# Patient Record
Sex: FEMALE | Race: WHITE | Employment: FULL TIME | URBAN - NONMETROPOLITAN AREA
[De-identification: names, ages, dates, MRNs, and addresses within clinical notes are randomized per-mention and may not be internally consistent; named-entity substitution may affect disease eponyms.]

---

## 2017-07-21 ENCOUNTER — HOSPITAL ENCOUNTER (EMERGENCY)
Age: 57
Discharge: HOME | End: 2017-07-21
Attending: EMERGENCY MEDICINE
Payer: COMMERCIAL

## 2017-07-21 VITALS
DIASTOLIC BLOOD PRESSURE: 69 MMHG | HEART RATE: 88 BPM | OXYGEN SATURATION: 96 % | RESPIRATION RATE: 20 BRPM | SYSTOLIC BLOOD PRESSURE: 108 MMHG | TEMPERATURE: 96.8 F

## 2017-07-21 DIAGNOSIS — R19.7 DIARRHEA, UNSPECIFIED TYPE: ICD-10-CM

## 2017-07-21 DIAGNOSIS — E87.6 HYPOKALEMIA: Primary | ICD-10-CM

## 2017-07-21 LAB
-: ABNORMAL
ABSOLUTE EOS #: 0.2 K/UL (ref 0–0.4)
ABSOLUTE LYMPH #: 0.9 K/UL (ref 1.1–2.7)
ABSOLUTE MONO #: 0.6 K/UL (ref 0–1)
ALBUMIN SERPL-MCNC: 4.3 G/DL (ref 3.5–5.2)
ALBUMIN/GLOBULIN RATIO: ABNORMAL (ref 1–2.5)
ALP BLD-CCNC: 90 U/L (ref 35–104)
ALT SERPL-CCNC: 29 U/L (ref 5–33)
AMORPHOUS: ABNORMAL
ANION GAP SERPL CALCULATED.3IONS-SCNC: 20 MMOL/L (ref 9–17)
AST SERPL-CCNC: 24 U/L
BACTERIA: ABNORMAL
BASOPHILS # BLD: 0 %
BASOPHILS ABSOLUTE: 0 K/UL (ref 0–0.2)
BILIRUB SERPL-MCNC: 0.57 MG/DL (ref 0.3–1.2)
BILIRUBIN URINE: ABNORMAL
BUN BLDV-MCNC: 12 MG/DL (ref 6–20)
BUN/CREAT BLD: 13 (ref 9–20)
CALCIUM SERPL-MCNC: 9.5 MG/DL (ref 8.6–10.4)
CAMPYLOBACTER PCR: NORMAL
CASTS UA: ABNORMAL /LPF
CHLORIDE BLD-SCNC: 100 MMOL/L (ref 98–107)
CO2: 22 MMOL/L (ref 20–31)
COLOR: YELLOW
COMMENT UA: ABNORMAL
CREAT SERPL-MCNC: 0.9 MG/DL (ref 0.5–0.9)
CRYSTALS, UA: ABNORMAL /HPF
DIFFERENTIAL TYPE: YES
EOSINOPHILS RELATIVE PERCENT: 2 %
EPITHELIAL CELLS UA: ABNORMAL /HPF
GFR AFRICAN AMERICAN: >60 ML/MIN
GFR NON-AFRICAN AMERICAN: >60 ML/MIN
GFR SERPL CREATININE-BSD FRML MDRD: ABNORMAL ML/MIN/{1.73_M2}
GFR SERPL CREATININE-BSD FRML MDRD: ABNORMAL ML/MIN/{1.73_M2}
GLUCOSE BLD-MCNC: 113 MG/DL (ref 70–99)
GLUCOSE URINE: NEGATIVE
HCT VFR BLD CALC: 43 % (ref 36–46)
HEMOGLOBIN: 14.1 G/DL (ref 12–16)
KETONES, URINE: ABNORMAL
LEUKOCYTE ESTERASE, URINE: ABNORMAL
LIPASE: 54 U/L (ref 13–60)
LYMPHOCYTES # BLD: 11 %
MCH RBC QN AUTO: 28.1 PG (ref 26–34)
MCHC RBC AUTO-ENTMCNC: 32.7 G/DL (ref 31–37)
MCV RBC AUTO: 86.1 FL (ref 80–100)
MONOCYTES # BLD: 7 %
MUCUS: ABNORMAL
NITRITE, URINE: NEGATIVE
OTHER OBSERVATIONS UA: ABNORMAL
PDW BLD-RTO: 15.3 % (ref 12.1–15.2)
PH UA: 6 (ref 5–8)
PLATELET # BLD: 279 K/UL (ref 140–450)
PLATELET ESTIMATE: ABNORMAL
PMV BLD AUTO: ABNORMAL FL (ref 6–12)
POTASSIUM SERPL-SCNC: 2.8 MMOL/L (ref 3.7–5.3)
PROTEIN UA: ABNORMAL
RBC # BLD: 4.99 M/UL (ref 4–5.2)
RBC # BLD: ABNORMAL 10*6/UL
RBC UA: ABNORMAL /HPF (ref 0–2)
RENAL EPITHELIAL, UA: ABNORMAL /HPF
SALMONELLA PCR: NORMAL
SEG NEUTROPHILS: 80 %
SEGMENTED NEUTROPHILS ABSOLUTE COUNT: 6.1 K/UL (ref 2.5–7)
SHIGATOXIN GENE PCR: NORMAL
SHIGELLA SP PCR: NORMAL
SODIUM BLD-SCNC: 142 MMOL/L (ref 135–144)
SPECIFIC GRAVITY UA: 1.02 (ref 1–1.03)
SPECIMEN: NORMAL
TOTAL PROTEIN: 7.8 G/DL (ref 6.4–8.3)
TRICHOMONAS: ABNORMAL
TURBIDITY: ABNORMAL
URINE HGB: ABNORMAL
UROBILINOGEN, URINE: NORMAL
WBC # BLD: 7.7 K/UL (ref 3.5–11)
WBC # BLD: ABNORMAL 10*3/UL
WBC UA: ABNORMAL /HPF
YEAST: ABNORMAL

## 2017-07-21 PROCEDURE — 36415 COLL VENOUS BLD VENIPUNCTURE: CPT

## 2017-07-21 PROCEDURE — 99283 EMERGENCY DEPT VISIT LOW MDM: CPT

## 2017-07-21 PROCEDURE — 6360000002 HC RX W HCPCS: Performed by: EMERGENCY MEDICINE

## 2017-07-21 PROCEDURE — 80053 COMPREHEN METABOLIC PANEL: CPT

## 2017-07-21 PROCEDURE — 85025 COMPLETE CBC W/AUTO DIFF WBC: CPT

## 2017-07-21 PROCEDURE — 87086 URINE CULTURE/COLONY COUNT: CPT

## 2017-07-21 PROCEDURE — 96365 THER/PROPH/DIAG IV INF INIT: CPT

## 2017-07-21 PROCEDURE — 87505 NFCT AGENT DETECTION GI: CPT

## 2017-07-21 PROCEDURE — 6370000000 HC RX 637 (ALT 250 FOR IP): Performed by: EMERGENCY MEDICINE

## 2017-07-21 PROCEDURE — 83690 ASSAY OF LIPASE: CPT

## 2017-07-21 PROCEDURE — 2580000003 HC RX 258: Performed by: EMERGENCY MEDICINE

## 2017-07-21 PROCEDURE — 81001 URINALYSIS AUTO W/SCOPE: CPT

## 2017-07-21 RX ORDER — POTASSIUM CHLORIDE 750 MG/1
40 TABLET, FILM COATED, EXTENDED RELEASE ORAL ONCE
Status: COMPLETED | OUTPATIENT
Start: 2017-07-21 | End: 2017-07-21

## 2017-07-21 RX ORDER — LOSARTAN POTASSIUM 50 MG/1
50 TABLET ORAL DAILY
COMMUNITY

## 2017-07-21 RX ORDER — ONDANSETRON 4 MG/1
4 TABLET, ORALLY DISINTEGRATING ORAL EVERY 8 HOURS PRN
Qty: 6 TABLET | Refills: 0 | Status: SHIPPED | OUTPATIENT
Start: 2017-07-21 | End: 2017-07-23

## 2017-07-21 RX ORDER — POTASSIUM CHLORIDE 7.45 MG/ML
10 INJECTION INTRAVENOUS ONCE
Status: COMPLETED | OUTPATIENT
Start: 2017-07-21 | End: 2017-07-21

## 2017-07-21 RX ORDER — 0.9 % SODIUM CHLORIDE 0.9 %
1000 INTRAVENOUS SOLUTION INTRAVENOUS ONCE
Status: COMPLETED | OUTPATIENT
Start: 2017-07-21 | End: 2017-07-21

## 2017-07-21 RX ORDER — POTASSIUM CHLORIDE 20 MEQ/1
20 TABLET, EXTENDED RELEASE ORAL 2 TIMES DAILY
Qty: 14 TABLET | Refills: 0 | Status: SHIPPED | OUTPATIENT
Start: 2017-07-21 | End: 2018-05-19 | Stop reason: ALTCHOICE

## 2017-07-21 RX ADMIN — SODIUM CHLORIDE 1000 ML: 9 INJECTION, SOLUTION INTRAVENOUS at 08:05

## 2017-07-21 RX ADMIN — POTASSIUM CHLORIDE 10 MEQ: 10 INJECTION, SOLUTION INTRAVENOUS at 08:53

## 2017-07-21 RX ADMIN — POTASSIUM CHLORIDE 40 MEQ: 750 TABLET, FILM COATED, EXTENDED RELEASE ORAL at 08:52

## 2017-07-21 ASSESSMENT — PAIN DESCRIPTION - LOCATION: LOCATION: ABDOMEN

## 2017-07-22 LAB
CULTURE: NORMAL
CULTURE: NORMAL
Lab: NORMAL
SPECIMEN DESCRIPTION: NORMAL
SPECIMEN DESCRIPTION: NORMAL
STATUS: NORMAL

## 2017-09-16 ENCOUNTER — HOSPITAL ENCOUNTER (OUTPATIENT)
Age: 57
Discharge: HOME | End: 2017-09-16
Payer: COMMERCIAL

## 2017-09-16 LAB
ALBUMIN SERPL-MCNC: 3.9 G/DL (ref 3.5–5.2)
ALBUMIN/GLOBULIN RATIO: ABNORMAL (ref 1–2.5)
ALP BLD-CCNC: 81 U/L (ref 35–104)
ALT SERPL-CCNC: 12 U/L (ref 5–33)
ANION GAP SERPL CALCULATED.3IONS-SCNC: 12 MMOL/L (ref 9–17)
AST SERPL-CCNC: 13 U/L
BILIRUB SERPL-MCNC: 0.47 MG/DL (ref 0.3–1.2)
BUN BLDV-MCNC: 15 MG/DL (ref 6–20)
BUN/CREAT BLD: 23 (ref 9–20)
CALCIUM SERPL-MCNC: 9.2 MG/DL (ref 8.6–10.4)
CHLORIDE BLD-SCNC: 105 MMOL/L (ref 98–107)
CHOLESTEROL/HDL RATIO: 3.6
CHOLESTEROL: 236 MG/DL
CO2: 25 MMOL/L (ref 20–31)
CREAT SERPL-MCNC: 0.64 MG/DL (ref 0.5–0.9)
GFR AFRICAN AMERICAN: >60 ML/MIN
GFR NON-AFRICAN AMERICAN: >60 ML/MIN
GFR SERPL CREATININE-BSD FRML MDRD: ABNORMAL ML/MIN/{1.73_M2}
GFR SERPL CREATININE-BSD FRML MDRD: ABNORMAL ML/MIN/{1.73_M2}
GLUCOSE BLD-MCNC: 99 MG/DL (ref 70–99)
HCT VFR BLD CALC: 37.6 % (ref 36–46)
HDLC SERPL-MCNC: 66 MG/DL
HEMOGLOBIN: 12.6 G/DL (ref 12–16)
LDL CHOLESTEROL: 143 MG/DL (ref 0–130)
MCH RBC QN AUTO: 28.6 PG (ref 26–34)
MCHC RBC AUTO-ENTMCNC: 33.5 G/DL (ref 31–37)
MCV RBC AUTO: 85.6 FL (ref 80–100)
PATIENT FASTING?: YES
PDW BLD-RTO: 15.2 % (ref 12.1–15.2)
PLATELET # BLD: 259 K/UL (ref 140–450)
PMV BLD AUTO: NORMAL FL (ref 6–12)
POTASSIUM SERPL-SCNC: 4.2 MMOL/L (ref 3.7–5.3)
RBC # BLD: 4.4 M/UL (ref 4–5.2)
SODIUM BLD-SCNC: 142 MMOL/L (ref 135–144)
TOTAL PROTEIN: 7.2 G/DL (ref 6.4–8.3)
TRIGL SERPL-MCNC: 134 MG/DL
TSH SERPL DL<=0.05 MIU/L-ACNC: 3.33 MIU/L (ref 0.3–5)
VLDLC SERPL CALC-MCNC: ABNORMAL MG/DL (ref 1–30)
WBC # BLD: 5.3 K/UL (ref 3.5–11)

## 2017-09-16 PROCEDURE — 36415 COLL VENOUS BLD VENIPUNCTURE: CPT

## 2017-09-16 PROCEDURE — 85027 COMPLETE CBC AUTOMATED: CPT

## 2017-09-16 PROCEDURE — 80053 COMPREHEN METABOLIC PANEL: CPT

## 2017-09-16 PROCEDURE — 80061 LIPID PANEL: CPT

## 2017-09-16 PROCEDURE — 84443 ASSAY THYROID STIM HORMONE: CPT

## 2018-04-14 ENCOUNTER — HOSPITAL ENCOUNTER (OUTPATIENT)
Age: 58
Discharge: HOME | End: 2018-04-14
Payer: COMMERCIAL

## 2018-04-14 LAB
ABSOLUTE EOS #: 0.3 K/UL (ref 0–0.4)
ABSOLUTE IMMATURE GRANULOCYTE: ABNORMAL K/UL (ref 0–0.3)
ABSOLUTE LYMPH #: 1.3 K/UL (ref 1–4.8)
ABSOLUTE MONO #: 0.4 K/UL (ref 0–1)
ALBUMIN SERPL-MCNC: 3.9 G/DL (ref 3.5–5.2)
ALBUMIN/GLOBULIN RATIO: ABNORMAL (ref 1–2.5)
ALP BLD-CCNC: 84 U/L (ref 35–104)
ALT SERPL-CCNC: 13 U/L (ref 5–33)
ANION GAP SERPL CALCULATED.3IONS-SCNC: 12 MMOL/L (ref 9–17)
AST SERPL-CCNC: 14 U/L
BASOPHILS # BLD: 0 % (ref 0–2)
BASOPHILS ABSOLUTE: 0 K/UL (ref 0–0.2)
BILIRUB SERPL-MCNC: 0.41 MG/DL (ref 0.3–1.2)
BUN BLDV-MCNC: 15 MG/DL (ref 6–20)
BUN/CREAT BLD: 22 (ref 9–20)
CALCIUM SERPL-MCNC: 9.4 MG/DL (ref 8.6–10.4)
CHLORIDE BLD-SCNC: 101 MMOL/L (ref 98–107)
CHOLESTEROL/HDL RATIO: 3.5
CHOLESTEROL: 233 MG/DL
CO2: 27 MMOL/L (ref 20–31)
CREAT SERPL-MCNC: 0.68 MG/DL (ref 0.5–0.9)
DIFFERENTIAL TYPE: YES
EOSINOPHILS RELATIVE PERCENT: 6 % (ref 0–5)
ESTIMATED AVERAGE GLUCOSE: 108 MG/DL
GFR AFRICAN AMERICAN: >60 ML/MIN
GFR NON-AFRICAN AMERICAN: >60 ML/MIN
GFR SERPL CREATININE-BSD FRML MDRD: ABNORMAL ML/MIN/{1.73_M2}
GFR SERPL CREATININE-BSD FRML MDRD: ABNORMAL ML/MIN/{1.73_M2}
GLUCOSE BLD-MCNC: 109 MG/DL (ref 70–99)
HBA1C MFR BLD: 5.4 % (ref 4.8–5.9)
HCT VFR BLD CALC: 39.3 % (ref 36–46)
HDLC SERPL-MCNC: 66 MG/DL
HEMOGLOBIN: 13.1 G/DL (ref 12–16)
IMMATURE GRANULOCYTES: ABNORMAL %
LDL CHOLESTEROL: 149 MG/DL (ref 0–130)
LYMPHOCYTES # BLD: 25 % (ref 15–40)
MCH RBC QN AUTO: 28.7 PG (ref 26–34)
MCHC RBC AUTO-ENTMCNC: 33.4 G/DL (ref 31–37)
MCV RBC AUTO: 85.9 FL (ref 80–100)
MONOCYTES # BLD: 7 % (ref 4–8)
NRBC AUTOMATED: ABNORMAL PER 100 WBC
PATIENT FASTING?: YES
PDW BLD-RTO: 15.5 % (ref 12.1–15.2)
PLATELET # BLD: 280 K/UL (ref 140–450)
PLATELET ESTIMATE: ABNORMAL
PMV BLD AUTO: ABNORMAL FL (ref 6–12)
POTASSIUM SERPL-SCNC: 4.5 MMOL/L (ref 3.7–5.3)
RBC # BLD: 4.58 M/UL (ref 4–5.2)
RBC # BLD: ABNORMAL 10*6/UL
SEG NEUTROPHILS: 62 % (ref 47–75)
SEGMENTED NEUTROPHILS ABSOLUTE COUNT: 3.3 K/UL (ref 2.5–7)
SODIUM BLD-SCNC: 140 MMOL/L (ref 135–144)
TOTAL PROTEIN: 7.3 G/DL (ref 6.4–8.3)
TRIGL SERPL-MCNC: 90 MG/DL
TSH SERPL DL<=0.05 MIU/L-ACNC: 2.72 MIU/L (ref 0.3–5)
VLDLC SERPL CALC-MCNC: ABNORMAL MG/DL (ref 1–30)
WBC # BLD: 5.4 K/UL (ref 3.5–11)
WBC # BLD: ABNORMAL 10*3/UL

## 2018-04-14 PROCEDURE — 36415 COLL VENOUS BLD VENIPUNCTURE: CPT

## 2018-04-14 PROCEDURE — 83036 HEMOGLOBIN GLYCOSYLATED A1C: CPT

## 2018-04-14 PROCEDURE — 85025 COMPLETE CBC W/AUTO DIFF WBC: CPT

## 2018-04-14 PROCEDURE — 80061 LIPID PANEL: CPT

## 2018-04-14 PROCEDURE — 84443 ASSAY THYROID STIM HORMONE: CPT

## 2018-04-14 PROCEDURE — 80053 COMPREHEN METABOLIC PANEL: CPT

## 2018-05-19 ENCOUNTER — HOSPITAL ENCOUNTER (EMERGENCY)
Age: 58
Discharge: HOME | End: 2018-05-19
Attending: FAMILY MEDICINE
Payer: COMMERCIAL

## 2018-05-19 ENCOUNTER — APPOINTMENT (OUTPATIENT)
Dept: GENERAL RADIOLOGY | Age: 58
End: 2018-05-19
Payer: COMMERCIAL

## 2018-05-19 VITALS
WEIGHT: 293 LBS | DIASTOLIC BLOOD PRESSURE: 61 MMHG | OXYGEN SATURATION: 96 % | HEIGHT: 62 IN | HEART RATE: 77 BPM | BODY MASS INDEX: 53.92 KG/M2 | RESPIRATION RATE: 16 BRPM | TEMPERATURE: 98.5 F | SYSTOLIC BLOOD PRESSURE: 121 MMHG

## 2018-05-19 DIAGNOSIS — M54.41 ACUTE RIGHT-SIDED LOW BACK PAIN WITH RIGHT-SIDED SCIATICA: Primary | ICD-10-CM

## 2018-05-19 LAB
BILIRUBIN URINE: NEGATIVE
COLOR: YELLOW
COMMENT UA: NORMAL
GLUCOSE URINE: NEGATIVE
KETONES, URINE: NEGATIVE
LEUKOCYTE ESTERASE, URINE: NEGATIVE
NITRITE, URINE: NEGATIVE
PH UA: 7 (ref 5–8)
PROTEIN UA: NEGATIVE
SPECIFIC GRAVITY UA: 1.01 (ref 1–1.03)
TURBIDITY: CLEAR
URINE HGB: NEGATIVE
UROBILINOGEN, URINE: NORMAL

## 2018-05-19 PROCEDURE — 6370000000 HC RX 637 (ALT 250 FOR IP): Performed by: FAMILY MEDICINE

## 2018-05-19 PROCEDURE — 72110 X-RAY EXAM L-2 SPINE 4/>VWS: CPT

## 2018-05-19 PROCEDURE — 96372 THER/PROPH/DIAG INJ SC/IM: CPT

## 2018-05-19 PROCEDURE — 6360000002 HC RX W HCPCS: Performed by: FAMILY MEDICINE

## 2018-05-19 PROCEDURE — 99284 EMERGENCY DEPT VISIT MOD MDM: CPT

## 2018-05-19 PROCEDURE — 81003 URINALYSIS AUTO W/O SCOPE: CPT

## 2018-05-19 PROCEDURE — 73521 X-RAY EXAM HIPS BI 2 VIEWS: CPT

## 2018-05-19 RX ORDER — PREDNISONE 20 MG/1
60 TABLET ORAL ONCE
Status: COMPLETED | OUTPATIENT
Start: 2018-05-19 | End: 2018-05-19

## 2018-05-19 RX ORDER — ORPHENADRINE CITRATE 30 MG/ML
60 INJECTION INTRAMUSCULAR; INTRAVENOUS ONCE
Status: COMPLETED | OUTPATIENT
Start: 2018-05-19 | End: 2018-05-19

## 2018-05-19 RX ORDER — KETOROLAC TROMETHAMINE 30 MG/ML
30 INJECTION, SOLUTION INTRAMUSCULAR; INTRAVENOUS ONCE
Status: COMPLETED | OUTPATIENT
Start: 2018-05-19 | End: 2018-05-19

## 2018-05-19 RX ORDER — PREDNISONE 10 MG/1
TABLET ORAL
Qty: 42 TABLET | Refills: 0 | Status: SHIPPED | OUTPATIENT
Start: 2018-05-19

## 2018-05-19 RX ADMIN — PREDNISONE 60 MG: 20 TABLET ORAL at 18:24

## 2018-05-19 RX ADMIN — KETOROLAC TROMETHAMINE 30 MG: 30 INJECTION, SOLUTION INTRAMUSCULAR at 16:13

## 2018-05-19 RX ADMIN — ORPHENADRINE CITRATE 60 MG: 30 INJECTION INTRAMUSCULAR; INTRAVENOUS at 16:12

## 2018-05-19 ASSESSMENT — PAIN DESCRIPTION - DIRECTION: RADIATING_TOWARDS: DOWN RIGHT LEG

## 2018-05-19 ASSESSMENT — PAIN DESCRIPTION - LOCATION: LOCATION: BACK

## 2018-05-19 ASSESSMENT — PAIN SCALES - GENERAL
PAINLEVEL_OUTOF10: 7
PAINLEVEL_OUTOF10: 10
PAINLEVEL_OUTOF10: 10

## 2018-05-19 ASSESSMENT — PAIN DESCRIPTION - PAIN TYPE
TYPE: ACUTE PAIN
TYPE: ACUTE PAIN

## 2019-05-04 ENCOUNTER — HOSPITAL ENCOUNTER (OUTPATIENT)
Age: 59
Discharge: HOME | End: 2019-05-04
Payer: COMMERCIAL

## 2019-05-04 LAB
ABSOLUTE EOS #: 0.2 K/UL (ref 0–0.4)
ABSOLUTE IMMATURE GRANULOCYTE: ABNORMAL K/UL (ref 0–0.3)
ABSOLUTE LYMPH #: 1.3 K/UL (ref 1–4.8)
ABSOLUTE MONO #: 0.3 K/UL (ref 0–1)
ALBUMIN SERPL-MCNC: 4.6 G/DL (ref 3.5–5.2)
ALBUMIN/GLOBULIN RATIO: ABNORMAL (ref 1–2.5)
ALP BLD-CCNC: 82 U/L (ref 35–104)
ALT SERPL-CCNC: 19 U/L (ref 5–33)
ANION GAP SERPL CALCULATED.3IONS-SCNC: 12 MMOL/L (ref 9–17)
AST SERPL-CCNC: 16 U/L
BASOPHILS # BLD: 1 % (ref 0–2)
BASOPHILS ABSOLUTE: 0 K/UL (ref 0–0.2)
BILIRUB SERPL-MCNC: 0.5 MG/DL (ref 0.3–1.2)
BILIRUBIN URINE: NEGATIVE
BUN BLDV-MCNC: 15 MG/DL (ref 6–20)
BUN/CREAT BLD: 24 (ref 9–20)
CALCIUM SERPL-MCNC: 9.3 MG/DL (ref 8.6–10.4)
CHLORIDE BLD-SCNC: 102 MMOL/L (ref 98–107)
CHOLESTEROL/HDL RATIO: 3.3
CHOLESTEROL: 223 MG/DL
CO2: 24 MMOL/L (ref 20–31)
COLOR: YELLOW
COMMENT UA: NORMAL
CREAT SERPL-MCNC: 0.62 MG/DL (ref 0.5–0.9)
DIFFERENTIAL TYPE: YES
EOSINOPHILS RELATIVE PERCENT: 5 % (ref 0–5)
ESTIMATED AVERAGE GLUCOSE: 114 MG/DL
GFR AFRICAN AMERICAN: >60 ML/MIN
GFR NON-AFRICAN AMERICAN: >60 ML/MIN
GFR SERPL CREATININE-BSD FRML MDRD: ABNORMAL ML/MIN/{1.73_M2}
GFR SERPL CREATININE-BSD FRML MDRD: ABNORMAL ML/MIN/{1.73_M2}
GLUCOSE BLD-MCNC: 111 MG/DL (ref 70–99)
GLUCOSE URINE: NEGATIVE
HBA1C MFR BLD: 5.6 % (ref 4.8–5.9)
HCT VFR BLD CALC: 39.7 % (ref 36–46)
HDLC SERPL-MCNC: 67 MG/DL
HEMOGLOBIN: 13.2 G/DL (ref 12–16)
IMMATURE GRANULOCYTES: ABNORMAL %
KETONES, URINE: NEGATIVE
LDL CHOLESTEROL: 135 MG/DL (ref 0–130)
LEUKOCYTE ESTERASE, URINE: NEGATIVE
LYMPHOCYTES # BLD: 31 % (ref 15–40)
MCH RBC QN AUTO: 29 PG (ref 26–34)
MCHC RBC AUTO-ENTMCNC: 33.3 G/DL (ref 31–37)
MCV RBC AUTO: 86.9 FL (ref 80–100)
MONOCYTES # BLD: 8 % (ref 4–8)
NITRITE, URINE: NEGATIVE
NRBC AUTOMATED: ABNORMAL PER 100 WBC
PATIENT FASTING?: YES
PDW BLD-RTO: 15.3 % (ref 12.1–15.2)
PH UA: 6.5 (ref 5–8)
PLATELET # BLD: 245 K/UL (ref 140–450)
PLATELET ESTIMATE: ABNORMAL
PMV BLD AUTO: ABNORMAL FL (ref 6–12)
POTASSIUM SERPL-SCNC: 4.2 MMOL/L (ref 3.7–5.3)
PROTEIN UA: NEGATIVE
RBC # BLD: 4.57 M/UL (ref 4–5.2)
RBC # BLD: ABNORMAL 10*6/UL
SEG NEUTROPHILS: 55 % (ref 47–75)
SEGMENTED NEUTROPHILS ABSOLUTE COUNT: 2.2 K/UL (ref 2.5–7)
SODIUM BLD-SCNC: 138 MMOL/L (ref 135–144)
SPECIFIC GRAVITY UA: 1.01 (ref 1–1.03)
TOTAL PROTEIN: 8 G/DL (ref 6.4–8.3)
TRIGL SERPL-MCNC: 103 MG/DL
TSH SERPL DL<=0.05 MIU/L-ACNC: 2.33 MIU/L (ref 0.3–5)
TURBIDITY: CLEAR
URINE HGB: NEGATIVE
UROBILINOGEN, URINE: NORMAL
VLDLC SERPL CALC-MCNC: ABNORMAL MG/DL (ref 1–30)
WBC # BLD: 4 K/UL (ref 3.5–11)
WBC # BLD: ABNORMAL 10*3/UL

## 2019-05-04 PROCEDURE — 80061 LIPID PANEL: CPT

## 2019-05-04 PROCEDURE — 36415 COLL VENOUS BLD VENIPUNCTURE: CPT

## 2019-05-04 PROCEDURE — 80053 COMPREHEN METABOLIC PANEL: CPT

## 2019-05-04 PROCEDURE — 84443 ASSAY THYROID STIM HORMONE: CPT

## 2019-05-04 PROCEDURE — 85025 COMPLETE CBC W/AUTO DIFF WBC: CPT

## 2019-05-04 PROCEDURE — 83036 HEMOGLOBIN GLYCOSYLATED A1C: CPT

## 2019-05-04 PROCEDURE — 81003 URINALYSIS AUTO W/O SCOPE: CPT

## 2019-08-24 ENCOUNTER — HOSPITAL ENCOUNTER (OUTPATIENT)
Age: 59
Discharge: HOME | End: 2019-08-24
Payer: COMMERCIAL

## 2019-08-24 LAB
ALBUMIN SERPL-MCNC: 4 G/DL (ref 3.5–5.2)
ALBUMIN/GLOBULIN RATIO: NORMAL (ref 1–2.5)
ALP BLD-CCNC: 88 U/L (ref 35–104)
ALT SERPL-CCNC: 11 U/L (ref 5–33)
AST SERPL-CCNC: 12 U/L
BILIRUB SERPL-MCNC: 0.57 MG/DL (ref 0.3–1.2)
BILIRUBIN DIRECT: <0.08 MG/DL
BILIRUBIN, INDIRECT: NORMAL MG/DL (ref 0–1)
CHOLESTEROL/HDL RATIO: 2.3
CHOLESTEROL: 155 MG/DL
GLOBULIN: NORMAL G/DL (ref 1.5–3.8)
HDLC SERPL-MCNC: 66 MG/DL
LDL CHOLESTEROL: 74 MG/DL (ref 0–130)
PATIENT FASTING?: YES
TOTAL CK: 98 U/L (ref 26–192)
TOTAL PROTEIN: 8 G/DL (ref 6.4–8.3)
TRIGL SERPL-MCNC: 74 MG/DL
VLDLC SERPL CALC-MCNC: NORMAL MG/DL (ref 1–30)

## 2019-08-24 PROCEDURE — 80061 LIPID PANEL: CPT

## 2019-08-24 PROCEDURE — 36415 COLL VENOUS BLD VENIPUNCTURE: CPT

## 2019-08-24 PROCEDURE — 82550 ASSAY OF CK (CPK): CPT

## 2019-08-24 PROCEDURE — 80076 HEPATIC FUNCTION PANEL: CPT

## 2020-02-23 ENCOUNTER — HOSPITAL ENCOUNTER (OUTPATIENT)
Age: 60
Discharge: HOME | End: 2020-02-23
Payer: COMMERCIAL

## 2020-02-23 LAB
ABSOLUTE EOS #: 0.3 K/UL (ref 0–0.4)
ABSOLUTE IMMATURE GRANULOCYTE: ABNORMAL K/UL (ref 0–0.3)
ABSOLUTE LYMPH #: 1.7 K/UL (ref 1–4.8)
ABSOLUTE MONO #: 0.4 K/UL (ref 0–1)
ALBUMIN SERPL-MCNC: 4.4 G/DL (ref 3.5–5.2)
ALBUMIN/GLOBULIN RATIO: ABNORMAL (ref 1–2.5)
ALP BLD-CCNC: 86 U/L (ref 35–104)
ALT SERPL-CCNC: 13 U/L (ref 5–33)
ANION GAP SERPL CALCULATED.3IONS-SCNC: 12 MMOL/L (ref 9–17)
AST SERPL-CCNC: 16 U/L
BASOPHILS # BLD: 1 % (ref 0–2)
BASOPHILS ABSOLUTE: 0 K/UL (ref 0–0.2)
BILIRUB SERPL-MCNC: 0.46 MG/DL (ref 0.3–1.2)
BUN BLDV-MCNC: 15 MG/DL (ref 6–20)
BUN/CREAT BLD: 21 (ref 9–20)
CALCIUM SERPL-MCNC: 10.1 MG/DL (ref 8.6–10.4)
CHLORIDE BLD-SCNC: 102 MMOL/L (ref 98–107)
CHOLESTEROL/HDL RATIO: 2.4
CHOLESTEROL: 184 MG/DL
CO2: 25 MMOL/L (ref 20–31)
CREAT SERPL-MCNC: 0.73 MG/DL (ref 0.5–0.9)
DIFFERENTIAL TYPE: YES
EOSINOPHILS RELATIVE PERCENT: 6 % (ref 0–5)
ESTIMATED AVERAGE GLUCOSE: 111 MG/DL
GFR AFRICAN AMERICAN: >60 ML/MIN
GFR NON-AFRICAN AMERICAN: >60 ML/MIN
GFR SERPL CREATININE-BSD FRML MDRD: ABNORMAL ML/MIN/{1.73_M2}
GFR SERPL CREATININE-BSD FRML MDRD: ABNORMAL ML/MIN/{1.73_M2}
GLUCOSE BLD-MCNC: 108 MG/DL (ref 70–99)
HBA1C MFR BLD: 5.5 % (ref 4.8–5.9)
HCT VFR BLD CALC: 40.3 % (ref 36–46)
HDLC SERPL-MCNC: 76 MG/DL
HEMOGLOBIN: 13.3 G/DL (ref 12–16)
IMMATURE GRANULOCYTES: ABNORMAL %
LDL CHOLESTEROL: 85 MG/DL (ref 0–130)
LYMPHOCYTES # BLD: 35 % (ref 15–40)
MCH RBC QN AUTO: 28.7 PG (ref 26–34)
MCHC RBC AUTO-ENTMCNC: 32.9 G/DL (ref 31–37)
MCV RBC AUTO: 87.1 FL (ref 80–100)
MONOCYTES # BLD: 8 % (ref 4–8)
NRBC AUTOMATED: ABNORMAL PER 100 WBC
PATIENT FASTING?: YES
PDW BLD-RTO: 14.5 % (ref 12.1–15.2)
PLATELET # BLD: 231 K/UL (ref 140–450)
PLATELET ESTIMATE: ABNORMAL
PMV BLD AUTO: ABNORMAL FL (ref 6–12)
POTASSIUM SERPL-SCNC: 4.4 MMOL/L (ref 3.7–5.3)
RBC # BLD: 4.63 M/UL (ref 4–5.2)
RBC # BLD: ABNORMAL 10*6/UL
SEG NEUTROPHILS: 50 % (ref 47–75)
SEGMENTED NEUTROPHILS ABSOLUTE COUNT: 2.5 K/UL (ref 2.5–7)
SODIUM BLD-SCNC: 139 MMOL/L (ref 135–144)
TOTAL PROTEIN: 7.8 G/DL (ref 6.4–8.3)
TRIGL SERPL-MCNC: 113 MG/DL
TSH SERPL DL<=0.05 MIU/L-ACNC: 2.51 MIU/L (ref 0.3–5)
VLDLC SERPL CALC-MCNC: NORMAL MG/DL (ref 1–30)
WBC # BLD: 4.9 K/UL (ref 3.5–11)
WBC # BLD: ABNORMAL 10*3/UL

## 2020-02-23 PROCEDURE — 84443 ASSAY THYROID STIM HORMONE: CPT

## 2020-02-23 PROCEDURE — 80061 LIPID PANEL: CPT

## 2020-02-23 PROCEDURE — 83036 HEMOGLOBIN GLYCOSYLATED A1C: CPT

## 2020-02-23 PROCEDURE — 36415 COLL VENOUS BLD VENIPUNCTURE: CPT

## 2020-02-23 PROCEDURE — 80053 COMPREHEN METABOLIC PANEL: CPT

## 2020-02-23 PROCEDURE — 85025 COMPLETE CBC W/AUTO DIFF WBC: CPT

## 2020-08-15 ENCOUNTER — HOSPITAL ENCOUNTER (OUTPATIENT)
Age: 60
Discharge: HOME | End: 2020-08-15
Payer: COMMERCIAL

## 2020-08-15 LAB
ABSOLUTE EOS #: 0.3 K/UL (ref 0–0.4)
ABSOLUTE IMMATURE GRANULOCYTE: ABNORMAL K/UL (ref 0–0.3)
ABSOLUTE LYMPH #: 1.4 K/UL (ref 1–4.8)
ABSOLUTE MONO #: 0.5 K/UL (ref 0–1)
ALBUMIN SERPL-MCNC: 4.1 G/DL (ref 3.5–5.2)
ALBUMIN/GLOBULIN RATIO: ABNORMAL (ref 1–2.5)
ALP BLD-CCNC: 97 U/L (ref 35–104)
ALT SERPL-CCNC: 11 U/L (ref 5–33)
ANION GAP SERPL CALCULATED.3IONS-SCNC: 10 MMOL/L (ref 9–17)
AST SERPL-CCNC: 16 U/L
BASOPHILS # BLD: 0 % (ref 0–2)
BASOPHILS ABSOLUTE: 0 K/UL (ref 0–0.2)
BILIRUB SERPL-MCNC: 0.62 MG/DL (ref 0.3–1.2)
BUN BLDV-MCNC: 18 MG/DL (ref 6–20)
BUN/CREAT BLD: 27 (ref 9–20)
CALCIUM SERPL-MCNC: 10.1 MG/DL (ref 8.6–10.4)
CHLORIDE BLD-SCNC: 101 MMOL/L (ref 98–107)
CHOLESTEROL/HDL RATIO: 2.1
CHOLESTEROL: 149 MG/DL
CO2: 26 MMOL/L (ref 20–31)
CREAT SERPL-MCNC: 0.67 MG/DL (ref 0.5–0.9)
DIFFERENTIAL TYPE: YES
EOSINOPHILS RELATIVE PERCENT: 4 % (ref 0–5)
GFR AFRICAN AMERICAN: >60 ML/MIN
GFR NON-AFRICAN AMERICAN: >60 ML/MIN
GFR SERPL CREATININE-BSD FRML MDRD: ABNORMAL ML/MIN/{1.73_M2}
GFR SERPL CREATININE-BSD FRML MDRD: ABNORMAL ML/MIN/{1.73_M2}
GLUCOSE BLD-MCNC: 108 MG/DL (ref 70–99)
HCT VFR BLD CALC: 31.8 % (ref 36–46)
HDLC SERPL-MCNC: 71 MG/DL
HEMOGLOBIN: 10.6 G/DL (ref 12–16)
IMMATURE GRANULOCYTES: ABNORMAL %
LDL CHOLESTEROL: 63 MG/DL (ref 0–130)
LYMPHOCYTES # BLD: 21 % (ref 15–40)
MCH RBC QN AUTO: 29.1 PG (ref 26–34)
MCHC RBC AUTO-ENTMCNC: 33.4 G/DL (ref 31–37)
MCV RBC AUTO: 87.2 FL (ref 80–100)
MONOCYTES # BLD: 7 % (ref 4–8)
NRBC AUTOMATED: ABNORMAL PER 100 WBC
PATIENT FASTING?: YES
PDW BLD-RTO: 14.1 % (ref 12.1–15.2)
PLATELET # BLD: 351 K/UL (ref 140–450)
PLATELET ESTIMATE: ABNORMAL
PMV BLD AUTO: ABNORMAL FL (ref 6–12)
POTASSIUM SERPL-SCNC: 4.3 MMOL/L (ref 3.7–5.3)
RBC # BLD: 3.65 M/UL (ref 4–5.2)
RBC # BLD: ABNORMAL 10*6/UL
SEG NEUTROPHILS: 68 % (ref 47–75)
SEGMENTED NEUTROPHILS ABSOLUTE COUNT: 4.6 K/UL (ref 2.5–7)
SODIUM BLD-SCNC: 137 MMOL/L (ref 135–144)
TOTAL PROTEIN: 8.8 G/DL (ref 6.4–8.3)
TRIGL SERPL-MCNC: 75 MG/DL
VLDLC SERPL CALC-MCNC: NORMAL MG/DL (ref 1–30)
WBC # BLD: 6.8 K/UL (ref 3.5–11)
WBC # BLD: ABNORMAL 10*3/UL

## 2020-08-15 PROCEDURE — 80053 COMPREHEN METABOLIC PANEL: CPT

## 2020-08-15 PROCEDURE — 80061 LIPID PANEL: CPT

## 2020-08-15 PROCEDURE — 36415 COLL VENOUS BLD VENIPUNCTURE: CPT

## 2020-08-15 PROCEDURE — 85025 COMPLETE CBC W/AUTO DIFF WBC: CPT

## 2020-08-31 ENCOUNTER — HOSPITAL ENCOUNTER (OUTPATIENT)
Dept: PHYSICAL THERAPY | Age: 60
Setting detail: THERAPIES SERIES
Discharge: HOME | End: 2020-08-31
Payer: COMMERCIAL

## 2020-08-31 PROCEDURE — 97161 PT EVAL LOW COMPLEX 20 MIN: CPT

## 2020-08-31 PROCEDURE — 97110 THERAPEUTIC EXERCISES: CPT

## 2020-08-31 ASSESSMENT — PAIN SCALES - GENERAL: PAINLEVEL_OUTOF10: 3

## 2020-08-31 NOTE — PLAN OF CARE
[facility] TENISHA LOPEZ       Phone: [phone]   Date: 2020                      Outpatient Physical Therapy  Fax: [phone]    ACCT #: [de-identified]                     Plan of Care  [facility]#: [id number]  Patient: Sravan Verma  : 1960    Referring Practitioner: Dr. Aren White    Referral Date : 20    Diagnosis: R TKA  Onset Date: 20  Treatment Diagnosis: R knee pain s/p TKA    Assessment  Body structures, Functions, Activity limitations: Decreased ADL status, Decreased functional mobility , Decreased ROM, Decreased strength, Decreased endurance, Decreased balance, Decreased high-level IADLs, Increased pain, Decreased posture  Assessment: Pt to benefit from ther ex for Hip and Knee strength B/L. Pt to also benefit from gait training to restore independent amb without AD. Manual therapy for scar and tissue mobility will also be completed PRN. Prognosis: Good    Treatment Plan   Days: 3(2-3) times per week Weeks: 6 weeks Total # of Visits Approved: 18    Patient Education/HEP, Therapeutic Exercise, Manual Therapy: Myofacial Release/Cupping, Manual Therapy: Mobilization/Manipulation, Gait Training, HP/CP and Electrical Stimulation     Goals  Short term goals  Time Frame for Short term goals: 9 visits  Short term goal 1: Pt to report independence and compliance with HEP for R knee ROM and Strengthening. Short term goal 2: Pt to amb >200ft with SC no deviations to improve independence with gait  Long term goals  Time Frame for Long term goals : 18 visits  Long term goal 1: Pt to score > 33/80 on LEFS to improve ADL nichelle. Long term goal 2: Pt to have 115deg of PROM knee flexion to improve nichelle to stairs and entering/exiting vehicles. Long term goal 3: Pt to have AROM knee ext =0deg to improve stand nichelle in preperation for return to work. Long term goal 4: Pt to maintain SLS 10sec 2:3 trials on level surface to improve stair safety.   Long term goal 5: Pt to complete 40# crate lift floor to shelf x10 with proper bodymechanics to assist with safe return to work. Seward Mcburney, PT   Date: 8/31/2020    ______________________________________ Date: 8/31/2020  Physician Signature  By signing above or cosigning electronically, I have reviewed this Plan of Care and certify a need for medically necessary rehabilitation services.

## 2020-08-31 NOTE — PROGRESS NOTES
Phone: [phone] [facility]         Fax: [phone]                      Outpatient Physical Therapy                                                                      Evaluation    Date: 2020  Patient: Niesha Pedraza  : 1960  ACCT #: [de-identified]    Referring Practitioner: Dr. Annette Nance    Referral Date : 20    Diagnosis: R TKA    Treatment Diagnosis: R knee pain s/p TKA  Onset Date: 20  PT Insurance Information:  20v  Total # of Visits Approved: 18 Per Physician Order  Total # of Visits to Date: 1  No Show: 0  Canceled Appointment: 0     Subjective  Additional Pertinent Hx: Pt underwent R TKA on 20, pain averaging 3/10, can reach 5/10. Massage on 20 which has helped sig. L knee has been painful since sx. Pt reports introduced some cane amb at home with [facility] PT (9 sessions). Pt does report that 2x while in hospital post op knee buckled during stance but has not done it since d/c home.   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  Pain Screening  Patient Currently in Pain: Yes  Pain Assessment  Pain Assessment: 0-10  Pain Level: 3     Objective  Joint Mobility  ROM RLE: DF=2deg  Strength RLE  R Hip Flexion: 3+/5  R Hip Extension: 3/5  R Hip ABduction: 3+/5  R Knee Flexion: 4-/5(painful anterior knee)  R Knee Extension: 3+/5  R Ankle Dorsiflexion: 5/5  AROM RLE (degrees)  R Knee Flexion 0-145: 100deg  R Knee Extension 0: 6deg FN  R Ankle Dorsiflexion 0-20: 2deg     AROM RLE (degrees)  R Knee Flexion 0-145: 100deg  R Knee Extension 0: 6deg FN  R Ankle Dorsiflexion 0-20: 2deg     Assessment  Body structures, Functions, Activity limitations: Decreased ADL status, Decreased functional mobility , Decreased ROM, Decreased strength, Decreased endurance, Decreased balance, Decreased high-level IADLs, Increased pain, Decreased posture  Assessment: Pt to benefit from ther ex for Hip and Knee strength B/L. Pt to also benefit from gait training to restore independent amb without AD. Manual therapy for scar and tissue mobility will also be completed PRN. Prognosis: Good  Decision Making: Low Complexity  History: Lumbar injections; R ankle painful-wears compression sleeve  Exam: LEFS- 23/80    Clinical Presentation:  Stable/Uncomplicated  The Following Comorbities will impact the patients progression and Plan of Care:   Cardiac Disease/Pacemaker and Previous Orthopedic Injury/Surgery       Activity Tolerance: Patient Tolerated treatment well    Education: POC; Therex for balance, ROM and proprioception     Barriers to Learning: None    Goals  Short term goals  Time Frame for Short term goals: 9 visits  Short term goal 1: Pt to report independence and compliance with HEP for R knee ROM and Strengthening. Short term goal 2: Pt to amb >200ft with SC no deviations to improve independence with gait    Long term goals  Time Frame for Long term goals : 18 visits  Long term goal 1: Pt to score > 33/80 on LEFS to improve ADL nichelle. Long term goal 2: Pt to have 115deg of PROM knee flexion to improve nichelle to stairs and entering/exiting vehicles. Long term goal 3: Pt to have AROM knee ext =0deg to improve stand nichelle in preperation for return to work. Long term goal 4: Pt to maintain SLS 10sec 2:3 trials on level surface to improve stair safety. Long term goal 5: Pt to complete 40# crate lift floor to shelf x10 with proper bodymechanics to assist with safe return to work.     Patient's Goal:  Patient goals : Be able to walk without AD    Timed Code Treatment Minutes: 15 Minutes  Total Treatment Time: 50     Time In: 5882  Time Out: [address] [facility], PT Date: 8/31/2020

## 2020-09-01 ENCOUNTER — HOSPITAL ENCOUNTER (OUTPATIENT)
Dept: PHYSICAL THERAPY | Age: 60
Setting detail: THERAPIES SERIES
Discharge: HOME | End: 2020-09-01
Payer: COMMERCIAL

## 2020-09-01 PROCEDURE — 97110 THERAPEUTIC EXERCISES: CPT

## 2020-09-01 ASSESSMENT — PAIN SCALES - GENERAL: PAINLEVEL_OUTOF10: 2

## 2020-09-01 NOTE — PROGRESS NOTES
shelf x10 with proper bodymechanics to assist with safe return to work.     Post Treatment Pain:  0/10    Time In: 1030    Time Out : 1115   Timed Code Treatment Minutes: 45 Minutes  Total Treatment Time: [address] [place], [place]     Date: 9/1/2020

## 2020-09-04 ENCOUNTER — HOSPITAL ENCOUNTER (OUTPATIENT)
Dept: PHYSICAL THERAPY | Age: 60
Setting detail: THERAPIES SERIES
Discharge: HOME | End: 2020-09-04
Payer: COMMERCIAL

## 2020-09-04 PROCEDURE — 97110 THERAPEUTIC EXERCISES: CPT

## 2020-09-04 ASSESSMENT — PAIN SCALES - GENERAL: PAINLEVEL_OUTOF10: 4

## 2020-09-04 NOTE — PROGRESS NOTES
Phone: [phone]      Fax: [phone]                            Outpatient Physical Therapy                                                                            Daily Note    Date: 2020  Patient Name: Gideon Bustamante        MRN: [id number]   ACCT#:  [de-identified]  : 1960  (61 y.o.)    Referring Practitioner: Dr. Ced Alvarado    Referral Date : 20    Diagnosis: R TKA  Treatment Diagnosis: R knee pain s/p TKA    Onset Date: 20  PT Insurance Information:  20v  Total # of Visits Approved: 18 Per Physician Order  Total # of Visits to Date: 3  No Show: 0  Canceled Appointment: 0    Pre-Treatment Pain:  4/10     Assessment  Assessment: Patient reports R knee pain is a 3-4/10 this afternoon. She states she has been having some increased pain after completing hip ext exercise at home. Plan to have patient complete less reps of this exercise and ice after completing. Continued with strengthening and ROM exercises as outlined with good tolerance from patient. R knee PROM flex = 108 deg. Patient has small red spot medial to her incision. Plan to have patient monitor this area. She returns to see Dr. Alexandr Lugo next Thursday. Following session patient reports pain decreased to a 1-2/10. Chart Reviewed: Yes    Plan  Plan: Continue with current plan    Exercises/Modalities/Manual:  See DocFlow Sheet    Education:      Barriers to Learning: None    Goals  (Total # of Visits to Date: 3)   Short Term Goals - Time Frame for Short term goals: 9 visits  Short term goal 1: Pt to report independence and compliance with HEP for R knee ROM and Strengthening. - MET  Short term goal 2: Pt to amb >200ft with SC no deviations to improve independence with gait    Long Term Goals - Time Frame for Long term goals : 18 visits  Long term goal 1: Pt to score > 33/80 on LEFS to improve ADL nichelle.   Long term goal 2: Pt to have 115deg of PROM knee flexion to improve nichelle to stairs and entering/exiting vehicles. Long term goal 3: Pt to have AROM knee ext =0deg to improve stand nichelle in preperation for return to work. Long term goal 4: Pt to maintain SLS 10sec 2:3 trials on level surface to improve stair safety. Long term goal 5: Pt to complete 40# crate lift floor to shelf x10 with proper bodymechanics to assist with safe return to work.     Post Treatment Pain:  2/10    Time In: 1250    Time Out : 1330   Timed Code Treatment Minutes: 40 Minutes  Total Treatment Time: 36 Minutes    Daniele Whiting     Date: 9/4/2020

## 2020-09-09 ENCOUNTER — HOSPITAL ENCOUNTER (OUTPATIENT)
Dept: PHYSICAL THERAPY | Age: 60
Setting detail: THERAPIES SERIES
Discharge: HOME | End: 2020-09-09
Payer: COMMERCIAL

## 2020-09-09 PROCEDURE — 97110 THERAPEUTIC EXERCISES: CPT

## 2020-09-09 ASSESSMENT — PAIN SCALES - GENERAL: PAINLEVEL_OUTOF10: 1

## 2020-09-09 NOTE — PROGRESS NOTES
Phone: [address]            Fax: [phone]                             Outpatient Physical Therapy                                                                      Progress Report    Date: 2020   MRN: [id number]    ACCT#: [de-identified]  Patient: Claritza Palomo  : 1960  Referring Practitioner: Dr. Jarrod Cardenas    Referral Date : 20    Diagnosis: R TKA      Treatment Diagnosis: R knee pain s/p TKA    Onset Date: 20  PT Insurance Information: 80/20 20v  Total # of Visits Approved: 18 Per Physician Order  Total # of Visits to Date: 4  No Show: 0  Canceled Appointment: 0    Assessment  Patient has been compliant with all therapy appointments and HEP. She ambulates with walker in community, but notes she mostly uses cane at home. Patient ascends and descends stairs with step to pattern. R knee PROM = 0-110 deg. Patient also has small red area medial to incision. Will continue to progress LE strength and knee ROM. Prognosis: Good    Plan  Plan: Continue with current plan    Goals  Short term goals  Time Frame for Short term goals: 9 visits  Short term goal 1: Pt to report independence and compliance with HEP for R knee ROM and Strengthening. - MET  Short term goal 2: Pt to amb >200ft with SC no deviations to improve independence with gait    Long term goals  Time Frame for Long term goals : 18 visits  Long term goal 1: Pt to score > 33/80 on LEFS to improve ADL nichelle. Long term goal 2: Pt to have 115deg of PROM knee flexion to improve nichelle to stairs and entering/exiting vehicles. Long term goal 3: Pt to have AROM knee ext =0deg to improve stand nichelle in preperation for return to work. Long term goal 4: Pt to maintain SLS 10sec 2:3 trials on level surface to improve stair safety. Long term goal 5: Pt to complete 40# crate lift floor to shelf x10 with proper bodymechanics to assist with safe return to work.     Gerald Griggs    Date: 2020

## 2020-09-09 NOTE — PROGRESS NOTES
Phone: [phone]      Fax: [phone]                            Outpatient Physical Therapy                                                                            Daily Note    Date: 2020  Patient Name: Sravan Verma        MRN: [id number]   ACCT#:  [de-identified]  : 1960  (61 y.o.)    Referring Practitioner: Dr. Aren White    Referral Date : 20    Diagnosis: R TKA  Treatment Diagnosis: R knee pain s/p TKA    Onset Date: 20  PT Insurance Information:  20v  Total # of Visits Approved: 18 Per Physician Order  Total # of Visits to Date: 4  No Show: 0  Canceled Appointment: 0    Pre-Treatment Pain:  1/10     Assessment  Assessment: Patient reports R knee pain is a 1/10 this morning. Completed exercises as outlined with good tolerance from patient. She ambulates into session with FWW, but notes she mostly uses cane at home. Told patient to bring cane with her next visit so she can practice in clinic. R knee PROM = 0-110 deg. Patient ascends and descends stairs with step to pattern. Patient to ask Dr. Hedda Nageotte about small red area medial to incision. Chart Reviewed: Yes    Plan  Plan: Continue with current plan    Exercises/Modalities/Manual:  See DocFlow Sheet    Education:      Barriers to Learning: None    Goals  (Total # of Visits to Date: 4)   Short Term Goals - Time Frame for Short term goals: 9 visits  Short term goal 1: Pt to report independence and compliance with HEP for R knee ROM and Strengthening. - MET  Short term goal 2: Pt to amb >200ft with SC no deviations to improve independence with gait             Long Term Goals - Time Frame for Long term goals : 18 visits  Long term goal 1: Pt to score > 33/80 on LEFS to improve ADL nichelle. Long term goal 2: Pt to have 115deg of PROM knee flexion to improve nichelle to stairs and entering/exiting vehicles.   Long term goal 3: Pt to have AROM knee ext =0deg to improve stand nichelle in preperation for return to work. Long term goal 4: Pt to maintain SLS 10sec 2:3 trials on level surface to improve stair safety. Long term goal 5: Pt to complete 40# crate lift floor to shelf x10 with proper bodymechanics to assist with safe return to work.     Post Treatment Pain:  0/10    Time In: 1120    Time Out : 1158   Timed Code Treatment Minutes: 38 Minutes  Total Treatment Time: [address]     Date: 9/9/2020

## 2020-09-11 ENCOUNTER — HOSPITAL ENCOUNTER (OUTPATIENT)
Dept: PHYSICAL THERAPY | Age: 60
Setting detail: THERAPIES SERIES
Discharge: HOME | End: 2020-09-11
Payer: COMMERCIAL

## 2020-09-11 PROCEDURE — 97110 THERAPEUTIC EXERCISES: CPT

## 2020-09-11 ASSESSMENT — PAIN SCALES - GENERAL: PAINLEVEL_OUTOF10: 3

## 2020-09-11 NOTE — PROGRESS NOTES
Phone: [phone]      Fax: [phone]                            Outpatient Physical Therapy                                                                            Daily Note    Date: 2020  Patient Name: Sea Judge        MRN: [id number]   ACCT#:  [de-identified]  : 1960  (61 y.o.)    Referring Practitioner: Dr. Zhang Alvarado    Referral Date : 20    Diagnosis: R TKA  Treatment Diagnosis: R knee pain s/p TKA    Onset Date: 20  PT Insurance Information:  20v  Total # of Visits Approved: 18 Per Physician Order  Total # of Visits to Date: 5  No Show: 0  Canceled Appointment: 0    Pre-Treatment Pain:  3/10     Assessment  Assessment: Pt followed up with physician, no concerns with the red area. Pt reports she is allowed to use vitamin E and transition to cane. Pt reports that she will follow back up with physician in Nov.  Physician pleased with motion and recommends cont x4wks  Pt able to complete step taps without UE with no LOB. PROM [id number]  Chart Reviewed: Yes    Plan  Plan: Continue with current plan    Exercises/Modalities/Manual:  See DocFlow Sheet    Goals  (Total # of Visits to Date: 5)   Short Term Goals - Time Frame for Short term goals: 9 visits  Short term goal 1: Pt to report independence and compliance with HEP for R knee ROM and Strengthening. - MET  Short term goal 2: Pt to amb >200ft with SC no deviations to improve independence with gait    Long Term Goals - Time Frame for Long term goals : 18 visits  Long term goal 1: Pt to score > 33/80 on LEFS to improve ADL nichelle. Long term goal 2: Pt to have 115deg of PROM knee flexion to improve nichelle to stairs and entering/exiting vehicles. Long term goal 3: Pt to have AROM knee ext =0deg to improve stand nichelle in preperation for return to work. Long term goal 4: Pt to maintain SLS 10sec 2:3 trials on level surface to improve stair safety.   Long term goal 5: Pt to complete 40# crate lift floor to shelf x10 with proper bodymechanics to assist with safe return to work.     Post Treatment Pain:  3/10        Time In: 1115  Time Out: 1200  Timed Code Treatment Minutes: 45 Minutes    Total time: [address] [facility], PT     Date: 9/11/2020

## 2020-09-14 ENCOUNTER — HOSPITAL ENCOUNTER (OUTPATIENT)
Dept: PHYSICAL THERAPY | Age: 60
Setting detail: THERAPIES SERIES
Discharge: HOME | End: 2020-09-14
Payer: COMMERCIAL

## 2020-09-14 PROCEDURE — 97110 THERAPEUTIC EXERCISES: CPT

## 2020-09-14 ASSESSMENT — PAIN SCALES - GENERAL: PAINLEVEL_OUTOF10: 2

## 2020-09-14 NOTE — PROGRESS NOTES
Phone: [address]      Fax: [phone]                            Outpatient Physical Therapy                                                                            Daily Note    Date: 2020  Patient Name: Leni Del Rosario        MRN: [id number]   ACCT#:  [de-identified]  : 1960  (61 y.o.)    Referring Practitioner: Dr. Shane Zavala    Referral Date : 20    Diagnosis: R TKA  Treatment Diagnosis: R knee pain s/p TKA    Onset Date: 20  PT Insurance Information:  20v  Total # of Visits Approved: 18 Per Physician Order  Total # of Visits to Date: 6  No Show: 0  Canceled Appointment: 0    Pre-Treatment Pain:  2/10     Assessment  Assessment: Patient reports R knee pain is a 2/10 this afternoon. She states she was on her feet quite a bit over the weekend without too much of an increase in pain. Continued with strengthening and ROM exercises as outlined. Patient was able to camublate with SC throughout session with no gait deviations. R knee PROM flex = 112 deg. Chart Reviewed: Yes    Plan  Plan: Continue with current plan    Exercises/Modalities/Manual:  See DocFlow Sheet    Education:      Barriers to Learning: None    Goals  (Total # of Visits to Date: 6)   Short Term Goals - Time Frame for Short term goals: 9 visits  Short term goal 1: Pt to report independence and compliance with HEP for R knee ROM and Strengthening. - MET  Short term goal 2: Pt to amb >200ft with SC no deviations to improve independence with gait             Long Term Goals - Time Frame for Long term goals : 18 visits  Long term goal 1: Pt to score > 33/80 on LEFS to improve ADL nichelle. Long term goal 2: Pt to have 115deg of PROM knee flexion to improve nichelle to stairs and entering/exiting vehicles. Long term goal 3: Pt to have AROM knee ext =0deg to improve stand nichelle in preperation for return to work.   Long term goal 4: Pt to maintain SLS 10sec 2:3 trials on level surface to improve stair safety. Long term goal 5: Pt to complete 40# crate lift floor to shelf x10 with proper bodymechanics to assist with safe return to work.     Post Treatment Pain:  2/10    Time In: 1247    Time Out : 1327   Timed Code Treatment Minutes: 40 Minutes  Total Treatment Time: 36 Minutes    Daniele Boswell     Date: 9/14/2020

## 2020-09-16 ENCOUNTER — HOSPITAL ENCOUNTER (OUTPATIENT)
Dept: PHYSICAL THERAPY | Age: 60
Setting detail: THERAPIES SERIES
Discharge: HOME | End: 2020-09-16
Payer: COMMERCIAL

## 2020-09-16 PROCEDURE — 97110 THERAPEUTIC EXERCISES: CPT

## 2020-09-16 PROCEDURE — 97140 MANUAL THERAPY 1/> REGIONS: CPT

## 2020-09-16 ASSESSMENT — PAIN SCALES - GENERAL: PAINLEVEL_OUTOF10: 0

## 2020-09-16 NOTE — PROGRESS NOTES
Phone: [address]      Fax: [phone]                            Outpatient Physical Therapy                                                                            Daily Note    Date: 2020  Patient Name: Leni Del Rosario        MRN: [id number]   ACCT#:  [de-identified]  : 1960  (61 y.o.)    Referring Practitioner: Dr. Shane Zavala    Referral Date : 20    Diagnosis: R TKA  Treatment Diagnosis: R knee pain s/p TKA    Onset Date: 20  PT Insurance Information: 80 20v  Total # of Visits Approved: 18 Per Physician Order  Total # of Visits to Date: 7  No Show: 0  Canceled Appointment: 0    Pre-Treatment Pain:  0/10     Assessment  Assessment: Patient reports no R knee pain this afternoon. She states neither her back or knee bothered her much last night and was able to sleep pretty well. Progressed LE strengthening exercises this afternoon with good tolerance from patient. R knee PROM flex = 111 deg. Chart Reviewed: Yes    Plan  Plan: Continue with current plan    Exercises/Modalities/Manual:  See DocFlow Sheet    Education:      Barriers to Learning: None    Goals  (Total # of Visits to Date: 7)   Short Term Goals - Time Frame for Short term goals: 9 visits  Short term goal 1: Pt to report independence and compliance with HEP for R knee ROM and Strengthening. - MET  Short term goal 2: Pt to amb >200ft with SC no deviations to improve independence with gait    Long Term Goals - Time Frame for Long term goals : 18 visits  Long term goal 1: Pt to score > 33/80 on LEFS to improve ADL nichelle. Long term goal 2: Pt to have 115deg of PROM knee flexion to improve nichelle to stairs and entering/exiting vehicles. Long term goal 3: Pt to have AROM knee ext =0deg to improve stand nichelle in preperation for return to work. Long term goal 4: Pt to maintain SLS 10sec 2:3 trials on level surface to improve stair safety.   Long term goal 5: Pt to complete 40# crate lift floor to shelf x10 with proper bodymechanics to assist with safe return to work.     Post Treatment Pain:  1/10    Time In: 1248    Time Out : 1330   Timed Code Treatment Minutes: 42 Minutes  Total Treatment Time: [address], [place]     Date: 9/16/2020

## 2020-09-18 ENCOUNTER — HOSPITAL ENCOUNTER (OUTPATIENT)
Dept: PHYSICAL THERAPY | Age: 60
Setting detail: THERAPIES SERIES
Discharge: HOME | End: 2020-09-18
Payer: COMMERCIAL

## 2020-09-18 PROCEDURE — 97110 THERAPEUTIC EXERCISES: CPT

## 2020-09-18 ASSESSMENT — PAIN SCALES - GENERAL: PAINLEVEL_OUTOF10: 2

## 2020-09-18 NOTE — PROGRESS NOTES
Phone: [address]      Fax: [phone]                            Outpatient Physical Therapy                                                                            Daily Note    Date: 2020  Patient Name: Cricket Guevara        MRN: [id number]   ACCT#:  [de-identified]  : 1960  (61 y.o.)    Referring Practitioner: Dr. Octavio Kearns    Referral Date : 20    Diagnosis: R TKA  Treatment Diagnosis: R knee pain s/p TKA    Onset Date: 20  PT Insurance Information: 80 20v  Total # of Visits Approved: 18 Per Physician Order  Total # of Visits to Date: 8  No Show: 0  Canceled Appointment: 0    Pre-Treatment Pain:  0/10     Assessment  Assessment: Pt with good nichelle to increased resistance on S/L [employer]. Pt reports going to [employer] yesterday with increased tightness but no sig pain. Went to [employer] today with increased stiffness. PROM Flexion 110deg  Chart Reviewed: Yes    Plan  Plan: Continue with current plan    Exercises/Modalities/Manual:  See DocFlow Sheet    Education: Improving scar mobility     Barriers to Learning: None    Goals  (Total # of Visits to Date: 8)   Short Term Goals - Time Frame for Short term goals: 9 visits  Short term goal 1: Pt to report independence and compliance with HEP for R knee ROM and Strengthening. - MET  Short term goal 2: Pt to amb >200ft with SC no deviations to improve independence with gait-MET             Long Term Goals - Time Frame for Long term goals : 18 visits  Long term goal 1: Pt to score > 33/80 on LEFS to improve ADL nichelle. Long term goal 2: Pt to have 115deg of PROM knee flexion to improve nichelle to stairs and entering/exiting vehicles. Long term goal 3: Pt to have AROM knee ext =0deg to improve stand nichelle in preperation for return to work. Long term goal 4: Pt to maintain SLS 10sec 2:3 trials on level surface to improve stair safety.   Long term goal 5: Pt to complete 40# crate lift floor to shelf x10 with proper bodymechanics to assist with safe return to work.     Post Treatment Pain:  0/10    Time In:1305  Time Out : 1350  Timed Code Treatment Minutes: 42 Minutes  Total Treatment Time: [address], PT     Date: 9/18/2020

## 2020-09-21 ENCOUNTER — HOSPITAL ENCOUNTER (OUTPATIENT)
Dept: PHYSICAL THERAPY | Age: 60
Setting detail: THERAPIES SERIES
Discharge: HOME | End: 2020-09-21
Payer: COMMERCIAL

## 2020-09-21 PROCEDURE — 97110 THERAPEUTIC EXERCISES: CPT

## 2020-09-21 ASSESSMENT — PAIN SCALES - GENERAL: PAINLEVEL_OUTOF10: 2

## 2020-09-21 NOTE — PROGRESS NOTES
with safe return to work.     Post Treatment Pain:  5/10    Time In: 1020  Time Out : 1002  Timed Code Treatment Minutes: 42 Minutes  Total Treatment Time: [address], PT     Date: 9/21/2020

## 2020-09-23 ENCOUNTER — HOSPITAL ENCOUNTER (OUTPATIENT)
Dept: PHYSICAL THERAPY | Age: 60
Setting detail: THERAPIES SERIES
Discharge: HOME | End: 2020-09-23
Payer: COMMERCIAL

## 2020-09-23 PROCEDURE — 97110 THERAPEUTIC EXERCISES: CPT

## 2020-09-23 ASSESSMENT — PAIN SCALES - GENERAL: PAINLEVEL_OUTOF10: 1

## 2020-09-23 NOTE — PROGRESS NOTES
Phone: [phone]      Fax: [phone]                            Outpatient Physical Therapy                                                                            Daily Note    Date: 2020  Patient Name: Jorge Gallegos        MRN: [id number]   ACCT#:  [de-identified]  : 1960  (61 y.o.)    Referring Practitioner: Dr. Nalini Taylor    Referral Date : 20    Diagnosis: R TKA  Treatment Diagnosis: R knee pain s/p TKA    Onset Date: 20  PT Insurance Information:  20v  Total # of Visits Approved: 18 Per Physician Order  Total # of Visits to Date: 10  No Show: 0  Canceled Appointment: 0    Pre-Treatment Pain:  1/10     Assessment  Assessment: Patient ambulates into clinic this afternoon without device and minimal deviation. Patient states R knee feels tight. Patient rates pain a 1/10 this afternoon. Completed exercises as outlined for LE strength and knee ROM. Patien was able to complete crate lift with proper form and no increased pain. R knee PROM flex = 108 deg. Chart Reviewed: Yes    Plan  Plan: Continue with current plan    Exercises/Modalities/Manual:  See DocFlow Sheet    Education:      Barriers to Learning: None    Goals  (Total # of Visits to Date: 10)   Short Term Goals - Time Frame for Short term goals: 9 visits  Short term goal 1: Pt to report independence and compliance with HEP for R knee ROM and Strengthening. - MET  Short term goal 2: Pt to amb >200ft with SC no deviations to improve independence with gait - MET    Long Term Goals - Time Frame for Long term goals : 18 visits  Long term goal 1: Pt to score > 33/80 on LEFS to improve ADL nichelle. Long term goal 2: Pt to have 115deg of PROM knee flexion to improve nichelle to stairs and entering/exiting vehicles. Long term goal 3: Pt to have AROM knee ext =0deg to improve stand nichelle in preperation for return to work.   Long term goal 4: Pt to maintain SLS 10sec 2:3 trials on level surface to improve stair safety. Long term goal 5: Pt to complete 40# crate lift floor to shelf x10 with proper bodymechanics to assist with safe return to work.     Post Treatment Pain:  0/10    Time In: 1303    Time Out : 1345   Timed Code Treatment Minutes: 42 Minutes  Total Treatment Time: [address], [place]     Date: 9/23/2020

## 2020-09-25 ENCOUNTER — HOSPITAL ENCOUNTER (OUTPATIENT)
Dept: PHYSICAL THERAPY | Age: 60
Setting detail: THERAPIES SERIES
Discharge: HOME | End: 2020-09-25
Payer: COMMERCIAL

## 2020-09-25 PROCEDURE — 97110 THERAPEUTIC EXERCISES: CPT

## 2020-09-25 ASSESSMENT — PAIN SCALES - GENERAL: PAINLEVEL_OUTOF10: 3

## 2020-09-25 NOTE — PROGRESS NOTES
Phone: [address]      Fax: [phone]                            Outpatient Physical Therapy                                                                            Daily Note    Date: 2020  Patient Name: Sabra Burrell        MRN: [id number]   ACCT#:  [de-identified]  : 1960  (61 y.o.)    Referring Practitioner: Dr. Wallace Strickland    Referral Date : 20    Diagnosis: R TKA  Treatment Diagnosis: R knee pain s/p TKA    Onset Date: 20  PT Insurance Information:  20v  Total # of Visits Approved: 18 Per Physician Order  Total # of Visits to Date: 11  No Show: 0  Canceled Appointment: 0    Pre-Treatment Pain:  4/10     Assessment  Assessment: Pt was standing at the sink for a longer duration this date with increased soreness of knee and low back. Pt amb without SC and slight decrease in step length on L.  AROM Ext=0deg  Chart Reviewed: Yes    Plan  Plan: Continue with current plan    Exercises/Modalities/Manual:  See DocFlow Sheet    Education: VCs for lifting posture, massage/crossfriction for HS tendons     Barriers to Learning: None    Goals  (Total # of Visits to Date: 6)   Short Term Goals - Time Frame for Short term goals: 9 visits  Short term goal 1: Pt to report independence and compliance with HEP for R knee ROM and Strengthening. - MET  Short term goal 2: Pt to amb >200ft with SC no deviations to improve independence with gait - MET       Long Term Goals - Time Frame for Long term goals : 18 visits  Long term goal 1: Pt to score > 33/80 on LEFS to improve ADL nichelle. Long term goal 2: Pt to have 115deg of PROM knee flexion to improve nichelle to stairs and entering/exiting vehicles. Long term goal 3: Pt to have AROM knee ext =0deg to improve stand nicehlle in preperation for return to work. -MET  Long term goal 4: Pt to maintain SLS 10sec 2:3 trials on level surface to improve stair safety.   Long term goal 5: Pt to complete 40# crate lift floor to shelf

## 2020-09-28 ENCOUNTER — HOSPITAL ENCOUNTER (OUTPATIENT)
Dept: PHYSICAL THERAPY | Age: 60
Setting detail: THERAPIES SERIES
Discharge: HOME | End: 2020-09-28
Payer: COMMERCIAL

## 2020-09-28 PROCEDURE — 97110 THERAPEUTIC EXERCISES: CPT

## 2020-09-28 NOTE — PROGRESS NOTES
Phone: [address]      Fax: [phone]                            Outpatient Physical Therapy                                                                            Daily Note    Date: 2020  Patient Name: Yessy Mcmillan        MRN: [id number]   ACCT#:  [de-identified]  : 1960  (61 y.o.)    Referring Practitioner: Dr. Gustavo Grace    Referral Date : 20    Diagnosis: R TKA  Treatment Diagnosis: R knee pain s/p TKA    Onset Date: 20  PT Insurance Information:  20v  Total # of Visits Approved: 18 Per Physician Order  Total # of Visits to Date: 12  No Show: 0  Canceled Appointment: 0    Pre-Treatment Pain:  0/10     Assessment  Assessment: Pt did not complete ther ex over weekend due to having company. Pt reports no pain this AM just stiffness. Best effort SLS 2sec this date. Instructed pt to begin practicing at home at sink/countertop. PROM Flexion 105deg  Chart Reviewed: Yes    Plan  Plan: Continue with current plan    Exercises/Modalities/Manual:  See DocFlow Sheet    Education: SLS education     Barriers to Learning: None    Goals  (Total # of Visits to Date: 15)   Short Term Goals - Time Frame for Short term goals: 9 visits  Short term goal 1: Pt to report independence and compliance with HEP for R knee ROM and Strengthening. - MET  Short term goal 2: Pt to amb >200ft with SC no deviations to improve independence with gait - MET             Long Term Goals - Time Frame for Long term goals : 18 visits  Long term goal 1: Pt to score > 33/80 on LEFS to improve ADL nichelle. Long term goal 2: Pt to have 115deg of PROM knee flexion to improve nichelle to stairs and entering/exiting vehicles. Long term goal 3: Pt to have AROM knee ext =0deg to improve stand nichelle in preperation for return to work. -MET  Long term goal 4: Pt to maintain SLS 10sec 2:3 trials on level surface to improve stair safety.   Long term goal 5: Pt to complete 40# crate lift floor to shelf x10 with proper bodymechanics to assist with safe return to work.     Post Treatment Pain:  2/10    Time In: 0910  Time Out: 0950  Timed Code Treatment Minutes: 40 Minutes  Total Treatment Time: Arthur Riggs PT     Date: 9/28/2020

## 2020-09-30 ENCOUNTER — HOSPITAL ENCOUNTER (OUTPATIENT)
Dept: PHYSICAL THERAPY | Age: 60
Setting detail: THERAPIES SERIES
Discharge: HOME | End: 2020-09-30
Payer: COMMERCIAL

## 2020-09-30 PROCEDURE — 97110 THERAPEUTIC EXERCISES: CPT

## 2020-09-30 ASSESSMENT — PAIN SCALES - GENERAL: PAINLEVEL_OUTOF10: 2

## 2020-09-30 NOTE — PROGRESS NOTES
Phone: [phone]      Fax: [phone]                            Outpatient Physical Therapy                                                                            Daily Note    Date: 2020  Patient Name: Marlon Ford        MRN: [id number]   ACCT#:  [de-identified]  : 1960  (61 y.o.)    Referring Practitioner: Dr. Bird Kim    Referral Date : 20    Diagnosis: R TKA  Treatment Diagnosis: R knee pain s/p TKA    Onset Date: 20  PT Insurance Information: 80 20v  Total # of Visits Approved: 18 Per Physician Order  Total # of Visits to Date: 13  No Show: 0  Canceled Appointment: 0    Pre-Treatment Pain:  2/10     Assessment  Assessment: Pt reports  Pain 2/10. PROM to 106 deg flex. SLS 5 sec 1/3 efforts. Will cont. Chart Reviewed: Yes    Plan  Plan: Continue with current plan    Exercises/Modalities/Manual:  See DocFlow Sheet         Barriers to Learning: None    Goals  (Total # of Visits to Date: 15)   Short Term Goals - Time Frame for Short term goals: 9 visits  Short term goal 1: Pt to report independence and compliance with HEP for R knee ROM and Strengthening. - MET  Short term goal 2: Pt to amb >200ft with SC no deviations to improve independence with gait - MET             Long Term Goals - Time Frame for Long term goals : 18 visits  Long term goal 1: Pt to score > 33/80 on LEFS to improve ADL nichelle. Long term goal 2: Pt to have 115deg of PROM knee flexion to improve nichelle to stairs and entering/exiting vehicles. Long term goal 3: Pt to have AROM knee ext =0deg to improve stand nichelle in preperation for return to work. -MET  Long term goal 4: Pt to maintain SLS 10sec 2:3 trials on level surface to improve stair safety. Long term goal 5: Pt to complete 40# crate lift floor to shelf x10 with proper bodymechanics to assist with safe return to work.     Post Treatment Pain:  2/10    Time In: 0900    Time Out : 0940        Timed Code Treatment Minutes: 40 Minutes  Total Treatment Time: 40 Minutes    Nazia Qureshi PTA     Date: 9/30/2020

## 2020-10-02 ENCOUNTER — HOSPITAL ENCOUNTER (OUTPATIENT)
Dept: PHYSICAL THERAPY | Age: 60
Setting detail: THERAPIES SERIES
Discharge: HOME | End: 2020-10-02
Payer: COMMERCIAL

## 2020-10-02 PROCEDURE — 97110 THERAPEUTIC EXERCISES: CPT

## 2020-10-02 ASSESSMENT — PAIN SCALES - GENERAL: PAINLEVEL_OUTOF10: 1

## 2020-10-02 NOTE — PROGRESS NOTES
stair safety. Long term goal 5: Pt to complete 40# crate lift floor to shelf x10 with proper bodymechanics to assist with safe return to work.     Post Treatment Pain:  1/10      Time In: 0900  Time Out: 0940  Timed Code Treatment Minutes: 40 Minutes  Total Treatment Time: Arthur Riggs PT     Date: 10/2/2020

## 2020-10-05 ENCOUNTER — HOSPITAL ENCOUNTER (OUTPATIENT)
Dept: PHYSICAL THERAPY | Age: 60
Setting detail: THERAPIES SERIES
Discharge: HOME | End: 2020-10-05
Payer: COMMERCIAL

## 2020-10-05 PROCEDURE — 97110 THERAPEUTIC EXERCISES: CPT

## 2020-10-05 ASSESSMENT — PAIN SCALES - GENERAL: PAINLEVEL_OUTOF10: 1

## 2020-10-05 NOTE — PROGRESS NOTES
term goal 5: Pt to complete 40# crate lift floor to shelf x10 with proper bodymechanics to assist with safe return to work.     Post Treatment Pain:  1/10    Time In: 0900    Time Out : 0940        Timed Code Treatment Minutes: 40 Minutes  Total Treatment Time: 40 Minutes    Gerber Qureshi PTA    Date: 10/5/2020

## 2020-10-07 ENCOUNTER — HOSPITAL ENCOUNTER (OUTPATIENT)
Dept: PHYSICAL THERAPY | Age: 60
Setting detail: THERAPIES SERIES
Discharge: HOME | End: 2020-10-07
Payer: COMMERCIAL

## 2020-10-07 PROCEDURE — 97110 THERAPEUTIC EXERCISES: CPT

## 2020-10-07 ASSESSMENT — PAIN SCALES - GENERAL: PAINLEVEL_OUTOF10: 1

## 2020-10-07 NOTE — PROGRESS NOTES
Phone: [address] [facility]      Fax: [phone]                            Outpatient Physical Therapy                                                                            Daily Note    Date: 10/7/2020  Patient Name: Jackson Wylie        MRN: [id number]   ACCT#:  [de-identified]  : 1960  (61 y.o.)    Referring Practitioner: Dr. Linda Grover    Referral Date : 20    Diagnosis: R TKA  Treatment Diagnosis: R knee pain s/p TKA    Onset Date: 20  PT Insurance Information:  20v  Total # of Visits Approved: 18 Per Physician Order  Total # of Visits to Date: 16  No Show: 0  Canceled Appointment: 0    Pre-Treatment Pain:  1/10     Assessment  Assessment: Pt wore different tennis shoes today which she thinks are more supportive. SLS remains <2 sec. Performed ex as outlined for LE strengthening and balance. PROM to 106 deg flexion. Plan 2 more visits. Chart Reviewed: Yes    Plan  Plan: Continue with current plan    Exercises/Modalities/Manual:  See DocFlow Sheet     Barriers to Learning: None    Goals  (Total # of Visits to Date: 12)   Short Term Goals - Time Frame for Short term goals: 9 visits  Short term goal 1: Pt to report independence and compliance with HEP for R knee ROM and Strengthening. - MET  Short term goal 2: Pt to amb >200ft with SC no deviations to improve independence with gait - MET             Long Term Goals - Time Frame for Long term goals : 18 visits  Long term goal 1: Pt to score > 33/80 on LEFS to improve ADL nichelle. Long term goal 2: Pt to have 115deg of PROM knee flexion to improve nichelle to stairs and entering/exiting vehicles. Long term goal 3: Pt to have AROM knee ext =0deg to improve stand nichelle in preperation for return to work. -MET  Long term goal 4: Pt to maintain SLS 10sec 2:3 trials on level surface to improve stair safety.   Long term goal 5: Pt to complete 40# crate lift floor to shelf x10 with proper bodymechanics to assist with safe return to work.     Post Treatment Pain:  1/10    Time In: 0900    Time Out : 0940        Timed Code Treatment Minutes: 40 Minutes  Total Treatment Time: 40 Minutes    Kia Qureshi  PTA   Date: 10/7/2020

## 2020-10-09 ENCOUNTER — HOSPITAL ENCOUNTER (OUTPATIENT)
Dept: PHYSICAL THERAPY | Age: 60
Setting detail: THERAPIES SERIES
Discharge: HOME | End: 2020-10-09
Payer: COMMERCIAL

## 2020-10-09 PROCEDURE — 97110 THERAPEUTIC EXERCISES: CPT

## 2020-10-09 ASSESSMENT — PAIN SCALES - GENERAL: PAINLEVEL_OUTOF10: 1

## 2020-10-12 ENCOUNTER — HOSPITAL ENCOUNTER (OUTPATIENT)
Dept: PHYSICAL THERAPY | Age: 60
Setting detail: THERAPIES SERIES
Discharge: HOME | End: 2020-10-12
Payer: COMMERCIAL

## 2020-10-12 PROCEDURE — 97110 THERAPEUTIC EXERCISES: CPT

## 2020-10-12 ASSESSMENT — PAIN SCALES - GENERAL: PAINLEVEL_OUTOF10: 1

## 2020-10-12 NOTE — PROGRESS NOTES
Phone: [phone]      Fax: [phone]                            Outpatient Physical Therapy                                                                            Daily Note    Date: 10/12/2020  Patient Name: Chelsea Reyes        MRN: [id number]   ACCT#:  [de-identified]  : 1960  (61 y.o.)    Referring Practitioner: Dr. Apolonia Clifford    Referral Date : 20    Diagnosis: R TKA  Treatment Diagnosis: R knee pain s/p TKA    Onset Date: 20  PT Insurance Information:  20v  Total # of Visits Approved: 18 Per Physician Order  Total # of Visits to Date: 18  No Show: 0  Canceled Appointment: 0    Pre-Treatment Pain:  1/10     Assessment  Assessment: LEFS 56/80. PROM to 110 deg flex. Pt with good understanding HEP. DC from therapy. Chart Reviewed: Yes    Plan  Plan: Discharge    Exercises/Modalities/Manual:  See DocFlow Sheet         Barriers to Learning: None    Goals  (Total # of Visits to Date: 25)   Short Term Goals - Time Frame for Short term goals: 9 visits  Short term goal 1: Pt to report independence and compliance with HEP for R knee ROM and Strengthening. - MET  Short term goal 2: Pt to amb >200ft with SC no deviations to improve independence with gait - MET       Long Term Goals - Time Frame for Long term goals : 18 visits  Long term goal 1: Pt to score > 33/80 on LEFS to improve ADL nichelle.-met  Long term goal 2: Pt to have 115deg of PROM knee flexion to improve nichelle to stairs and entering/exiting vehicles. -not met  Long term goal 3: Pt to have AROM knee ext =0deg to improve stand nichelle in preperation for return to work. -MET  Long term goal 4: Pt to maintain SLS 10sec 2:3 trials on level surface to improve stair safety.-not met  Long term goal 5: Pt to complete 40# crate lift floor to shelf x10 with proper bodymechanics to assist with safe return to work.-met    Post Treatment Pain:  1/10    Time In: 0900    Time Out : 09        Timed Code Treatment Minutes: 38 Minutes  Total Treatment Time: 38 Minutes    Daja Qureshi PTA    Date: 10/12/2020

## 2020-10-12 NOTE — DISCHARGE SUMMARY
Phone: [address]             Fax: [phone]                            Outpatient Physical Therapy                                                                    Discharge Summary    Patient: Fabian Earl  : 1960  ACCT #: [de-identified]   Referring Practitioner: Dr. Romero Meyers      Diagnosis: R TKA    Date Treatment Initiated: 20  Date of Last Treatment: 10/12/20    PT Visit Information  Onset Date: 20  PT Insurance Information:  20v  Total # of Visits Approved: 18  Total # of Visits to Date: 18  No Show: 0  Canceled Appointment: 0    Frequency/Duration  Days: 3(2-3) times per week  Weeks: 6 weeks    Treatment Received  Patient Education/HEP, Therapeutic Exercise, Manual Therapy: Myofacial Release/Cupping, Manual Therapy: Mobilization/Manipulation and Gait Training    Pain Level: 1    Assessment  LEFS 56/80. SLS  best effort 3sec. Good nichelle to 25# crate lift from floor. PROM to 110 deg flex. Pt with good understanding HEP and to continue with HEP for endurance training to prepare for return to work. Reason for Discharge  Completion of Prescribed visits and Optimal Function Achieved    Comments:   Thank you for this referral      Sahil Desir  Date: 10/12/2020

## 2021-02-13 ENCOUNTER — HOSPITAL ENCOUNTER (OUTPATIENT)
Age: 61
Discharge: HOME | End: 2021-02-13
Payer: COMMERCIAL

## 2021-02-13 LAB
ABSOLUTE EOS #: 0.2 K/UL (ref 0–0.4)
ABSOLUTE IMMATURE GRANULOCYTE: ABNORMAL K/UL (ref 0–0.3)
ABSOLUTE LYMPH #: 1.6 K/UL (ref 1–4.8)
ABSOLUTE MONO #: 0.4 K/UL (ref 0–1)
ALBUMIN SERPL-MCNC: 4.4 G/DL (ref 3.5–5.2)
ALBUMIN/GLOBULIN RATIO: ABNORMAL (ref 1–2.5)
ALP BLD-CCNC: 98 U/L (ref 35–104)
ALT SERPL-CCNC: 14 U/L (ref 5–33)
ANION GAP SERPL CALCULATED.3IONS-SCNC: 14 MMOL/L (ref 9–17)
AST SERPL-CCNC: 18 U/L
BASOPHILS # BLD: 1 % (ref 0–2)
BASOPHILS ABSOLUTE: 0.1 K/UL (ref 0–0.2)
BILIRUB SERPL-MCNC: 0.6 MG/DL (ref 0.3–1.2)
BUN BLDV-MCNC: 16 MG/DL (ref 8–23)
BUN/CREAT BLD: 21 (ref 9–20)
CALCIUM SERPL-MCNC: 10.3 MG/DL (ref 8.6–10.4)
CHLORIDE BLD-SCNC: 102 MMOL/L (ref 98–107)
CHOLESTEROL/HDL RATIO: 2.1
CHOLESTEROL: 179 MG/DL
CO2: 22 MMOL/L (ref 20–31)
CREAT SERPL-MCNC: 0.75 MG/DL (ref 0.5–0.9)
DIFFERENTIAL TYPE: YES
EOSINOPHILS RELATIVE PERCENT: 4 % (ref 0–5)
GFR AFRICAN AMERICAN: >60 ML/MIN
GFR NON-AFRICAN AMERICAN: >60 ML/MIN
GFR SERPL CREATININE-BSD FRML MDRD: ABNORMAL ML/MIN/{1.73_M2}
GFR SERPL CREATININE-BSD FRML MDRD: ABNORMAL ML/MIN/{1.73_M2}
GLUCOSE BLD-MCNC: 94 MG/DL (ref 70–99)
HCT VFR BLD CALC: 38.4 % (ref 36–46)
HDLC SERPL-MCNC: 86 MG/DL
HEMOGLOBIN: 12.9 G/DL (ref 12–16)
IMMATURE GRANULOCYTES: ABNORMAL %
LDL CHOLESTEROL: 76 MG/DL (ref 0–130)
LYMPHOCYTES # BLD: 31 % (ref 15–40)
MCH RBC QN AUTO: 28.5 PG (ref 26–34)
MCHC RBC AUTO-ENTMCNC: 33.5 G/DL (ref 31–37)
MCV RBC AUTO: 85.2 FL (ref 80–100)
MONOCYTES # BLD: 9 % (ref 4–8)
NRBC AUTOMATED: ABNORMAL PER 100 WBC
PATIENT FASTING?: YES
PDW BLD-RTO: 16.2 % (ref 12.1–15.2)
PLATELET # BLD: 254 K/UL (ref 140–450)
PLATELET ESTIMATE: ABNORMAL
PMV BLD AUTO: ABNORMAL FL (ref 6–12)
POTASSIUM SERPL-SCNC: 3.9 MMOL/L (ref 3.7–5.3)
RBC # BLD: 4.51 M/UL (ref 4–5.2)
RBC # BLD: ABNORMAL 10*6/UL
SEG NEUTROPHILS: 55 % (ref 47–75)
SEGMENTED NEUTROPHILS ABSOLUTE COUNT: 2.9 K/UL (ref 2.5–7)
SODIUM BLD-SCNC: 138 MMOL/L (ref 135–144)
TOTAL PROTEIN: 7.7 G/DL (ref 6.4–8.3)
TRIGL SERPL-MCNC: 85 MG/DL
VLDLC SERPL CALC-MCNC: NORMAL MG/DL (ref 1–30)
WBC # BLD: 5.2 K/UL (ref 3.5–11)
WBC # BLD: ABNORMAL 10*3/UL

## 2021-02-13 PROCEDURE — 85025 COMPLETE CBC W/AUTO DIFF WBC: CPT

## 2021-02-13 PROCEDURE — 83036 HEMOGLOBIN GLYCOSYLATED A1C: CPT

## 2021-02-13 PROCEDURE — 80061 LIPID PANEL: CPT

## 2021-02-13 PROCEDURE — 80053 COMPREHEN METABOLIC PANEL: CPT

## 2021-02-13 PROCEDURE — 36415 COLL VENOUS BLD VENIPUNCTURE: CPT

## 2021-02-14 LAB
ESTIMATED AVERAGE GLUCOSE: 105 MG/DL
HBA1C MFR BLD: 5.3 % (ref 4–6)

## 2021-04-27 ENCOUNTER — HOSPITAL ENCOUNTER (OUTPATIENT)
Dept: PHYSICAL THERAPY | Age: 61
Setting detail: THERAPIES SERIES
Discharge: HOME | End: 2021-04-27
Payer: COMMERCIAL

## 2021-04-27 PROCEDURE — 97140 MANUAL THERAPY 1/> REGIONS: CPT

## 2021-04-27 PROCEDURE — 97162 PT EVAL MOD COMPLEX 30 MIN: CPT

## 2021-04-27 ASSESSMENT — PAIN DESCRIPTION - LOCATION: LOCATION: ANKLE;FOOT

## 2021-04-28 NOTE — PLAN OF CARE
[facility] TENISHA LOPEZ       Phone: [phone]   Date: 2021                      Outpatient Physical Therapy  Fax: [phone]    ACCT #: [de-identified]                     Plan of Care  [facility]#: [id number]  Patient: Jacinta Armenta  : 1960    Referring Practitioner: Dr. Adam Hernandez DPM    Referral Date : 21    Diagnosis: Synovitis of right ankle, Arthritis R foot, Posterior tibial tendon dysfunction  Onset Date: 21(Referral)  Treatment Diagnosis: R foot pain    Assessment  Body structures, Functions, Activity limitations: Decreased functional mobility , Decreased ROM, Increased pain  Assessment: R foot pain in arch of foot from OA and stiff big toe preventing normal gait pattern. Completed manual therapy per Doc Flow. Plan for therx, HEP, manual therapy. Prognosis: Good    Treatment Plan   Days: 2 times per week Weeks: 6 weeks Total # of Visits Approved: 12    Patient Education/HEP, Therapeutic Exercise, Manual Therapy: Myofacial Release/Cupping and Manual Therapy: Mobilization/Manipulation     Goals  Short term goals  Time Frame for Short term goals: 8  Short term goal 1: Patient to be independent with HEP  Short term goal 2: Decrease pain R foot 6/10 at worst x 3 days  Short term goal 3: Increase R big toe extension 40 degrees for improved push off phase of gait  Long term goals  Time Frame for Long term goals : 12  Long term goal 1: Decrease pain R foot 4/10 at worst x 3 days  Long term goal 2: Gait pattern WFL without limp x 3 days  Long term goal 3: Improve functional mobility on LEFS score > 62/80     Jodi Garcia, PT   Date: 2021    ______________________________________ Date: 2021  Physician Signature  By signing above or cosigning electronically, I have reviewed this Plan of Care and certify a need for medically necessary rehabilitation services.

## 2021-04-28 NOTE — PROGRESS NOTES
Phone: [phone] [employer]         Fax: [phone]                      Outpatient Physical Therapy                                                                      Evaluation    Date: 2021  Patient: Lalito Dos Santos  : 1960  ACCT #: [de-identified]    Referring Practitioner: Dr. Ellie Campos DPM    Referral Date : 21    Diagnosis: Synovitis of right ankle, Arthritis R foot, Posterior tibial tendon dysfunction    Treatment Diagnosis: R foot pain  Onset Date: 21(Referral)  PT Insurance Information: BCBS  Total # of Visits Approved: 12 Per Physician Order  Total # of Visits to Date: 1     Subjective     Additional Pertinent Hx: Patient c/o right foot pain that gets severe 10/10. Gradual onset of pain for about one year, worsened after returning back to work in 2020 after 12 weeks medical leave for knee surgery. Hx:  R TKA in 2020, L TKA , Gallbladder removed, obesity, OA, chronic low back pain. Pain worse after sitting when first weight bears on R foot, 10/10 sharp pain . Got custom arch support last week , but only 4 hours a day currently due to painful. Went to PT in [facility] twice- got HEP. Swelling in R foot by end of day; throbs at night, trouble falling asleep. X-ray- arthritis in R foot. Works full time at [employer], [employer], standing on feet all day in [employer]. Meds- Diclofenac as needed for pain.   Pain Screening  Patient Currently in Pain: Yes  Pain Assessment  Pain Assessment: 0-10  Pain Level: 10(1-10 depending on activity)  Pain Location: Ankle, Foot  Pain Orientation: Right  Social/Functional History  Lives With: Spouse  Type of Home: House  Occupation: Full time employment  Type of occupation: factory work       Objective           Strength RLE  Strength RLE: WFL                PROM RLE (degrees)  RLE PROM: Exceptions  RLE General PROM: Big toe extension 7 degrees  R Ankle Dorsiflexion 0-20: 10  R Ankle Plantar Flexion 0-45: 40  R Ankle Forefoot Inversion 0-40: 25  R Ankle Forefoot Eversion 0-20: 10                 Ambulation 1  Device: No Device  Quality of Gait: intermittent limp; decreased push off phase R LE             Assessment  Body structures, Functions, Activity limitations: Decreased functional mobility , Decreased ROM, Increased pain  Assessment: R foot pain in arch of foot from OA and stiff big toe preventing normal gait pattern. Completed manual therapy per Doc Flow. Plan for therx, HEP, manual therapy. Prognosis: Good  Decision Making: Medium Complexity  History: as above  Exam: LEFS score 52/80    Clinical Presentation:  Evolving  The Following Comorbities will impact the patients progression and Plan of Care:   Obesity and Previous Orthopedic Injury/Surgery    Education: On POC         Goals  Short term goals  Time Frame for Short term goals: 8  Short term goal 1: Patient to be independent with HEP  Short term goal 2: Decrease pain R foot 6/10 at worst x 3 days  Short term goal 3:  Increase R big toe extension 40 degrees for improved push off phase of gait    Long term goals  Time Frame for Long term goals : 12  Long term goal 1: Decrease pain R foot 4/10 at worst x 3 days  Long term goal 2: Gait pattern WFL without limp x 3 days  Long term goal 3: Improve functional mobility on LEFS score > 62/80    Patient's Goal:    Be rid of foot pain     Timed Code Treatment Minutes: 15 Minutes  Total Treatment Time: 45     Time In: 15:10  Time Out: 15:55    Chasity Copeland PT Date: 4/27/2021

## 2021-04-30 ENCOUNTER — HOSPITAL ENCOUNTER (OUTPATIENT)
Dept: PHYSICAL THERAPY | Age: 61
Setting detail: THERAPIES SERIES
Discharge: HOME | End: 2021-04-30
Payer: COMMERCIAL

## 2021-04-30 PROCEDURE — 97110 THERAPEUTIC EXERCISES: CPT

## 2021-04-30 PROCEDURE — 97140 MANUAL THERAPY 1/> REGIONS: CPT

## 2021-04-30 NOTE — PROGRESS NOTES
Phone: [address]      Fax: [phone]                            Outpatient Physical Therapy                                                                            Daily Note    Date: 2021  Patient Name: Lucy Hargrove        MRN: [id number]   ACCT#:  [de-identified]  : 1960  (61 y.o.)    Referring Practitioner: Dr. Carter Perez DPM    Referral Date : 21    Diagnosis: Synovitis of right ankle, Arthritis R foot, Posterior tibial tendon dysfunction  Treatment Diagnosis: R foot pain    Onset Date: 21(Referral)  PT Insurance Information: BCBS  Total # of Visits Approved: 12 Per Physician Order  Total # of Visits to Date: 2  Plan of Care/Certification Expiration Date: 21    Pre-Treatment Pain:  5/10     Assessment  Assessment: Patient reports less pain in R foot 5/10, but now painful in bottom of left foot. Completed manual therapy per Doc Flow with plantar fascia mobs to both feet. Followed by therex per Doc Flow.  educated patient on and issued G t-band for HEP. Patient reports less pain in left foot after session, but R foot pain persist.  Chart Reviewed: Yes    Plan  Plan: Continue with current plan    Exercises/Modalities/Manual:  See DocFlow Sheet    Education:         Goals  (Total # of Visits to Date: 2)   Short Term Goals - Time Frame for Short term goals: 8  Short term goal 1: Patient to be independent with HEP  Short term goal 2: Decrease pain R foot 6/10 at worst x 3 days  Short term goal 3:  Increase R big toe extension 40 degrees for improved push off phase of gait          Long Term Goals - Time Frame for Long term goals : 12  Long term goal 1: Decrease pain R foot 4/10 at worst x 3 days  Long term goal 2: Gait pattern WFL without limp x 3 days  Long term goal 3: Improve functional mobility on LEFS score > 62/80          Post Treatment Pain:  5/10    Time In: 15:10    Time Out : 15:55        Timed Code Treatment Minutes: 45

## 2021-05-03 ENCOUNTER — APPOINTMENT (OUTPATIENT)
Dept: PHYSICAL THERAPY | Age: 61
End: 2021-05-03
Payer: COMMERCIAL

## 2021-05-05 ENCOUNTER — HOSPITAL ENCOUNTER (OUTPATIENT)
Dept: PHYSICAL THERAPY | Age: 61
Setting detail: THERAPIES SERIES
Discharge: HOME | End: 2021-05-05
Payer: COMMERCIAL

## 2021-05-05 PROCEDURE — 97110 THERAPEUTIC EXERCISES: CPT

## 2021-05-05 PROCEDURE — 97140 MANUAL THERAPY 1/> REGIONS: CPT

## 2021-05-05 ASSESSMENT — PAIN DESCRIPTION - LOCATION: LOCATION: FOOT

## 2021-05-05 ASSESSMENT — PAIN SCALES - GENERAL: PAINLEVEL_OUTOF10: 2

## 2021-05-07 ENCOUNTER — HOSPITAL ENCOUNTER (OUTPATIENT)
Dept: PHYSICAL THERAPY | Age: 61
Setting detail: THERAPIES SERIES
Discharge: HOME | End: 2021-05-07
Payer: COMMERCIAL

## 2021-05-07 PROCEDURE — 97110 THERAPEUTIC EXERCISES: CPT

## 2021-05-07 PROCEDURE — 97140 MANUAL THERAPY 1/> REGIONS: CPT

## 2021-05-07 NOTE — PROGRESS NOTES
Phone: [address]      Fax: [phone]                            Outpatient Physical Therapy                                                                            Daily Note    Date: 2021  Patient Name: Gabe Ziegler        MRN: [id number]   ACCT#:  [de-identified]  : 1960  (61 y.o.)    Referring Practitioner: Dr. Tk Hale DPM    Referral Date : 21    Diagnosis: Synovitis of right ankle, Arthritis R foot, Posterior tibial tendon dysfunction  Treatment Diagnosis: R foot pain    Onset Date: 21(Referral)  PT Insurance Information: BCBS  Total # of Visits Approved: 12 Per Physician Order  Total # of Visits to Date: 4  Plan of Care/Certification Expiration Date: 21    Pre-Treatment Pain:  3/10     Assessment  Assessment: Patient rates pain today as 3/10. Reports doing different things at work which may be the cause of the increase in pain. Continued with exercises and manual therapy as outlined above. Chart Reviewed: Yes    Plan  Plan: Continue with current plan    Exercises/Modalities/Manual:  See DocFlow Sheet    Education:          Goals  (Total # of Visits to Date: 4)   Short Term Goals - Time Frame for Short term goals: 8  Short term goal 1: Patient to be independent with HEP-met  Short term goal 2: Decrease pain R foot 6/10 at worst x 3 days-Met  Short term goal 3:  Increase R big toe extension 40 degrees for improved push off phase of gait          Long Term Goals - Time Frame for Long term goals : 12  Long term goal 1: Decrease pain R foot 4/10 at worst x 3 days  Long term goal 2: Gait pattern WFL without limp x 3 days  Long term goal 3: Improve functional mobility on LEFS score > 62/80          Post Treatment Pain:  2/10    Time In: 1515    Time Out : 1550        Timed Code Treatment Minutes: 35 Minutes  Total Treatment Time: 35 Minutes    Evelina Welch PTA    Date: 2021

## 2021-05-10 ENCOUNTER — HOSPITAL ENCOUNTER (OUTPATIENT)
Dept: PHYSICAL THERAPY | Age: 61
Setting detail: THERAPIES SERIES
Discharge: HOME | End: 2021-05-10
Payer: COMMERCIAL

## 2021-05-10 PROCEDURE — 97140 MANUAL THERAPY 1/> REGIONS: CPT

## 2021-05-10 PROCEDURE — 97110 THERAPEUTIC EXERCISES: CPT

## 2021-05-10 NOTE — PROGRESS NOTES
Phone: Augustine Mark      Fax: [phone]                            Outpatient Physical Therapy                                                                            Daily Note    Date: 5/10/2021  Patient Name: Shelby Santos        MRN: [id number]   ACCT#:  [de-identified]  : 1960  (61 y.o.)    Referring Practitioner: Dr. Rebel Azevedo DPM    Referral Date : 21    Diagnosis: Synovitis of right ankle, Arthritis R foot, Posterior tibial tendon dysfunction  Treatment Diagnosis: R foot pain    Onset Date: 21(Referral)  PT Insurance Information: BCBS  Total # of Visits Approved: 12 Per Physician Order  Total # of Visits to Date: 5  Plan of Care/Certification Expiration Date: 21    Pre-Treatment Pain:  0/10     Assessment  Assessment:  Patient continues to demonstrate a limp when ambulating into clinic this date stating after standing at work today for extended time she had increased pain with ambulation reaching 7/10. Continued with exercises to improve with strength and balance and manual for fleibility. Chart Reviewed: Yes    Plan  Plan: Continue with current plan    Exercises/Modalities/Manual:  See DocFlow Sheet    Education:           Goals  (Total # of Visits to Date: 5)   Short Term Goals - Time Frame for Short term goals: 8  Short term goal 1: Patient to be independent with HEP-met  Short term goal 2: Decrease pain R foot 6/10 at worst x 3 days-Met  Short term goal 3:  Increase R big toe extension 40 degrees for improved push off phase of gait          Long Term Goals - Time Frame for Long term goals : 12  Long term goal 1: Decrease pain R foot 4/10 at worst x 3 days  Long term goal 2: Gait pattern WFL without limp x 3 days  Long term goal 3: Improve functional mobility on LEFS score > 62/80          Post Treatment Pain:  5/10      Time In: 1516  Time Out: 1555  Timed Code Treatment Minutes: 39 Minutes  Total Treatment Time: 39 Minutes    Leti PAVON Casandra Ghosh, PTA     Date: 5/10/2021

## 2021-05-13 ENCOUNTER — HOSPITAL ENCOUNTER (OUTPATIENT)
Dept: PHYSICAL THERAPY | Age: 61
Setting detail: THERAPIES SERIES
Discharge: HOME | End: 2021-05-13
Payer: COMMERCIAL

## 2021-05-13 PROCEDURE — 97110 THERAPEUTIC EXERCISES: CPT

## 2021-05-13 PROCEDURE — 97140 MANUAL THERAPY 1/> REGIONS: CPT

## 2021-05-13 ASSESSMENT — PAIN SCALES - GENERAL: PAINLEVEL_OUTOF10: 1

## 2021-05-13 NOTE — PROGRESS NOTES
Phone: [address]      Fax: [phone]                            Outpatient Physical Therapy                                                                            Daily Note    Date: 2021  Patient Name: Arabella Haynes        MRN: [id number]   ACCT#:  [de-identified]  : 1960  (61 y.o.)    Referring Practitioner: Dr. Marco Antonio Pak DPM    Referral Date : 21    Diagnosis: Synovitis of right ankle, Arthritis R foot, Posterior tibial tendon dysfunction  Treatment Diagnosis: R foot pain    Onset Date: 21(Referral)  PT Insurance Information: BCBS  Total # of Visits Approved: 12 Per Physician Order  Total # of Visits to Date: 6  Plan of Care/Certification Expiration Date: 21    Pre-Treatment Pain:  1/10     Assessment  Assessment: Pain 1/10 R foot today with working on feet all week. Completed manual therapy and therex per Doc flow. ROM R toe extension 40 degrees; R ankle ROM WFL. Gait WFL, no limp x3 days. Patient pleased with progress and request being put on hold- will call if pain reoccurs. Chart Reviewed: Yes    Plan  Plan: (Hold per patient request)    Exercises/Modalities/Manual:  See DocFlow Sheet    Education:         Goals  (Total # of Visits to Date: 6)   Short Term Goals - Time Frame for Short term goals: 8  Short term goal 1: Patient to be independent with HEP-met  Short term goal 2: Decrease pain R foot 6/10 at worst x 3 days-Met  Short term goal 3:  Increase R big toe extension 40 degrees for improved push off phase of gait-met          Long Term Goals - Time Frame for Long term goals : 12  Long term goal 1: Decrease pain R foot 4/10 at worst x 3 days-Met  Long term goal 2: Gait pattern WFL without limp x 3 days-met  Long term goal 3: Improve functional mobility on LEFS score > 62/80          Post Treatment Pain:  1/10    Time In: 15:15    Time Out : 15:48        Timed Code Treatment Minutes: 33 Minutes  Total Treatment Time: 33 113 Petra Brooke, PT     Date: 5/13/2021

## 2021-05-13 NOTE — PROGRESS NOTES
Phone: [address] [facility]            Fax: [phone]                             Outpatient Physical Therapy                                                                      Progress Report    Date: 2021   MRN: [id number]    ACCT#: [de-identified]  Patient: Lesley Fernandez  : 1960  Referring Practitioner: Dr. Jenn Nick DPM    Referral Date : 21    Diagnosis: Synovitis of right ankle, Arthritis R foot, Posterior tibial tendon dysfunction      Treatment Diagnosis: R foot pain    Onset Date: 21(Referral)  PT Insurance Information: BCBS  Total # of Visits Approved: 12 Per Physician Order  Total # of Visits to Date: 6    Assessment  Assessment: Pain 1/10 R foot today with working on feet all week. Completed manual therapy and therex per Doc flow. ROM R toe extension 40 degrees; R ankle ROM WFL. Gait WFL, no limp x3 days. Patient pleased with progress and request being put on hold- will call if pain reoccurs. Prognosis: Good    Plan  Plan: (Hold per patient request)    Goals  Short term goals  Time Frame for Short term goals: 8  Short term goal 1: Patient to be independent with HEP-met  Short term goal 2: Decrease pain R foot 6/10 at worst x 3 days-Met  Short term goal 3:  Increase R big toe extension 40 degrees for improved push off phase of gait-met    Long term goals  Time Frame for Long term goals : 12  Long term goal 1: Decrease pain R foot 4/10 at worst x 3 days-Met  Long term goal 2: Gait pattern WFL without limp x 3 days-met  Long term goal 3: Improve functional mobility on LEFS score > 62/80    Sarah Castillo    Date: 2021

## 2021-05-17 ENCOUNTER — APPOINTMENT (OUTPATIENT)
Dept: PHYSICAL THERAPY | Age: 61
End: 2021-05-17
Payer: COMMERCIAL

## 2021-05-20 ENCOUNTER — APPOINTMENT (OUTPATIENT)
Dept: PHYSICAL THERAPY | Age: 61
End: 2021-05-20
Payer: COMMERCIAL

## 2021-08-24 ENCOUNTER — HOSPITAL ENCOUNTER (OUTPATIENT)
Age: 61
Discharge: HOME | End: 2021-08-24
Payer: COMMERCIAL

## 2021-08-24 LAB
ABSOLUTE EOS #: 0.2 K/UL (ref 0–0.4)
ABSOLUTE IMMATURE GRANULOCYTE: NORMAL K/UL (ref 0–0.3)
ABSOLUTE LYMPH #: 1.8 K/UL (ref 1–4.8)
ABSOLUTE MONO #: 0.5 K/UL (ref 0–1)
ALBUMIN SERPL-MCNC: 4.4 G/DL (ref 3.5–5.2)
ALBUMIN/GLOBULIN RATIO: ABNORMAL (ref 1–2.5)
ALP BLD-CCNC: 102 U/L (ref 35–104)
ALT SERPL-CCNC: 13 U/L (ref 5–33)
ANION GAP SERPL CALCULATED.3IONS-SCNC: 13 MMOL/L (ref 9–17)
AST SERPL-CCNC: 17 U/L
BASOPHILS # BLD: 1 % (ref 0–2)
BASOPHILS ABSOLUTE: 0 K/UL (ref 0–0.2)
BILIRUB SERPL-MCNC: 0.66 MG/DL (ref 0.3–1.2)
BUN BLDV-MCNC: 18 MG/DL (ref 8–23)
BUN/CREAT BLD: 20 (ref 9–20)
CALCIUM SERPL-MCNC: 9.8 MG/DL (ref 8.6–10.4)
CHLORIDE BLD-SCNC: 99 MMOL/L (ref 98–107)
CHOLESTEROL/HDL RATIO: 2.4
CHOLESTEROL: 187 MG/DL
CO2: 22 MMOL/L (ref 20–31)
CREAT SERPL-MCNC: 0.92 MG/DL (ref 0.5–0.9)
DIFFERENTIAL TYPE: YES
EOSINOPHILS RELATIVE PERCENT: 3 % (ref 0–5)
GFR AFRICAN AMERICAN: >60 ML/MIN
GFR NON-AFRICAN AMERICAN: >60 ML/MIN
GFR SERPL CREATININE-BSD FRML MDRD: ABNORMAL ML/MIN/{1.73_M2}
GFR SERPL CREATININE-BSD FRML MDRD: ABNORMAL ML/MIN/{1.73_M2}
GLUCOSE BLD-MCNC: 101 MG/DL (ref 70–99)
HCT VFR BLD CALC: 38.9 % (ref 36–46)
HDLC SERPL-MCNC: 79 MG/DL
HEMOGLOBIN: 13.2 G/DL (ref 12–16)
IMMATURE GRANULOCYTES: NORMAL %
LDL CHOLESTEROL: 86 MG/DL (ref 0–130)
LYMPHOCYTES # BLD: 27 % (ref 15–40)
MCH RBC QN AUTO: 29.1 PG (ref 26–34)
MCHC RBC AUTO-ENTMCNC: 33.9 G/DL (ref 31–37)
MCV RBC AUTO: 85.9 FL (ref 80–100)
MONOCYTES # BLD: 7 % (ref 4–8)
NRBC AUTOMATED: NORMAL PER 100 WBC
PATIENT FASTING?: YES
PDW BLD-RTO: 14.9 % (ref 12.1–15.2)
PLATELET # BLD: 254 K/UL (ref 140–450)
PLATELET ESTIMATE: NORMAL
PMV BLD AUTO: NORMAL FL (ref 6–12)
POTASSIUM SERPL-SCNC: 3.8 MMOL/L (ref 3.7–5.3)
RBC # BLD: 4.54 M/UL (ref 4–5.2)
RBC # BLD: NORMAL 10*6/UL
SEG NEUTROPHILS: 62 % (ref 47–75)
SEGMENTED NEUTROPHILS ABSOLUTE COUNT: 4.2 K/UL (ref 2.5–7)
SODIUM BLD-SCNC: 134 MMOL/L (ref 135–144)
TOTAL PROTEIN: 7.9 G/DL (ref 6.4–8.3)
TRIGL SERPL-MCNC: 111 MG/DL
VLDLC SERPL CALC-MCNC: NORMAL MG/DL (ref 1–30)
WBC # BLD: 6.7 K/UL (ref 3.5–11)
WBC # BLD: NORMAL 10*3/UL

## 2021-08-24 PROCEDURE — 80061 LIPID PANEL: CPT

## 2021-08-24 PROCEDURE — 85025 COMPLETE CBC W/AUTO DIFF WBC: CPT

## 2021-08-24 PROCEDURE — 80053 COMPREHEN METABOLIC PANEL: CPT

## 2021-08-24 PROCEDURE — 36415 COLL VENOUS BLD VENIPUNCTURE: CPT

## 2021-09-05 ENCOUNTER — APPOINTMENT (OUTPATIENT)
Dept: GENERAL RADIOLOGY | Age: 61
End: 2021-09-05
Payer: COMMERCIAL

## 2021-09-05 ENCOUNTER — HOSPITAL ENCOUNTER (EMERGENCY)
Age: 61
Discharge: HOME | End: 2021-09-05
Attending: EMERGENCY MEDICINE
Payer: COMMERCIAL

## 2021-09-05 VITALS
BODY MASS INDEX: 53.92 KG/M2 | HEART RATE: 96 BPM | OXYGEN SATURATION: 94 % | HEIGHT: 62 IN | WEIGHT: 293 LBS | TEMPERATURE: 101.1 F | SYSTOLIC BLOOD PRESSURE: 119 MMHG | DIASTOLIC BLOOD PRESSURE: 84 MMHG | RESPIRATION RATE: 18 BRPM

## 2021-09-05 DIAGNOSIS — U07.1 COVID-19: Primary | ICD-10-CM

## 2021-09-05 LAB
SARS-COV-2, RAPID: DETECTED
SPECIMEN DESCRIPTION: ABNORMAL

## 2021-09-05 PROCEDURE — 96374 THER/PROPH/DIAG INJ IV PUSH: CPT

## 2021-09-05 PROCEDURE — 99283 EMERGENCY DEPT VISIT LOW MDM: CPT

## 2021-09-05 PROCEDURE — 71045 X-RAY EXAM CHEST 1 VIEW: CPT

## 2021-09-05 PROCEDURE — C9803 HOPD COVID-19 SPEC COLLECT: HCPCS

## 2021-09-05 PROCEDURE — 6360000002 HC RX W HCPCS: Performed by: EMERGENCY MEDICINE

## 2021-09-05 PROCEDURE — 6370000000 HC RX 637 (ALT 250 FOR IP): Performed by: EMERGENCY MEDICINE

## 2021-09-05 PROCEDURE — 87635 SARS-COV-2 COVID-19 AMP PRB: CPT

## 2021-09-05 PROCEDURE — 94664 DEMO&/EVAL PT USE INHALER: CPT

## 2021-09-05 PROCEDURE — 2580000003 HC RX 258: Performed by: EMERGENCY MEDICINE

## 2021-09-05 RX ORDER — ONDANSETRON 4 MG/1
4 TABLET, FILM COATED ORAL EVERY 8 HOURS PRN
Qty: 20 TABLET | Refills: 0 | Status: SHIPPED | OUTPATIENT
Start: 2021-09-05 | End: 2021-09-12

## 2021-09-05 RX ORDER — ALBUTEROL SULFATE 90 UG/1
2 AEROSOL, METERED RESPIRATORY (INHALATION) ONCE
Status: COMPLETED | OUTPATIENT
Start: 2021-09-05 | End: 2021-09-05

## 2021-09-05 RX ORDER — ALBUTEROL SULFATE 90 UG/1
2 AEROSOL, METERED RESPIRATORY (INHALATION) 4 TIMES DAILY PRN
Qty: 18 G | Refills: 0 | Status: SHIPPED | OUTPATIENT
Start: 2021-09-05

## 2021-09-05 RX ORDER — KETOROLAC TROMETHAMINE 30 MG/ML
30 INJECTION, SOLUTION INTRAMUSCULAR; INTRAVENOUS ONCE
Status: DISCONTINUED | OUTPATIENT
Start: 2021-09-05 | End: 2021-09-05

## 2021-09-05 RX ORDER — 0.9 % SODIUM CHLORIDE 0.9 %
1000 INTRAVENOUS SOLUTION INTRAVENOUS ONCE
Status: COMPLETED | OUTPATIENT
Start: 2021-09-05 | End: 2021-09-05

## 2021-09-05 RX ORDER — CHLORAL HYDRATE 500 MG
1 CAPSULE ORAL DAILY
COMMUNITY

## 2021-09-05 RX ORDER — KETOROLAC TROMETHAMINE 30 MG/ML
15 INJECTION, SOLUTION INTRAMUSCULAR; INTRAVENOUS ONCE
Status: COMPLETED | OUTPATIENT
Start: 2021-09-05 | End: 2021-09-05

## 2021-09-05 RX ORDER — KETOROLAC TROMETHAMINE 10 MG/1
10 TABLET, FILM COATED ORAL EVERY 8 HOURS PRN
Qty: 15 TABLET | Refills: 0 | Status: SHIPPED | OUTPATIENT
Start: 2021-09-05

## 2021-09-05 RX ADMIN — SODIUM CHLORIDE 1000 ML: 9 INJECTION, SOLUTION INTRAVENOUS at 17:08

## 2021-09-05 RX ADMIN — ALBUTEROL SULFATE 2 PUFF: 90 AEROSOL, METERED RESPIRATORY (INHALATION) at 17:13

## 2021-09-05 RX ADMIN — KETOROLAC TROMETHAMINE 15 MG: 30 INJECTION, SOLUTION INTRAMUSCULAR; INTRAVENOUS at 17:08

## 2021-09-05 ASSESSMENT — PAIN DESCRIPTION - PAIN TYPE: TYPE: ACUTE PAIN

## 2021-09-05 ASSESSMENT — PAIN DESCRIPTION - FREQUENCY: FREQUENCY: INTERMITTENT

## 2021-09-05 ASSESSMENT — PAIN DESCRIPTION - DESCRIPTORS: DESCRIPTORS: ACHING

## 2021-09-05 ASSESSMENT — PAIN SCALES - GENERAL
PAINLEVEL_OUTOF10: 2
PAINLEVEL_OUTOF10: 5

## 2021-09-05 ASSESSMENT — PAIN DESCRIPTION - LOCATION: LOCATION: GENERALIZED

## 2021-09-05 NOTE — ED PROVIDER NOTES
HPI:  9/5/21,   Time: 4:41 PM EDT         Guanakito Peralta is a 61 y.o. female presenting to the ED for cough and congestion and chills and  in the same room has Covid as of the day, beginning last few days ago. The complaint has been constant, moderate in severity, and worsened by nothing. No alleviating factors. Positive fever and also diarrhea upset stomach    ROS:   Pertinent positives and negatives are stated within HPI, all other systems reviewed and are negative.  --------------------------------------------- PAST HISTORY ---------------------------------------------  Past Medical History:  has a past medical history of Arthritis, Back pain, GERD (gastroesophageal reflux disease), and Hypertension. Past Surgical History:  has a past surgical history that includes Cholecystectomy and joint replacement (Left, 2015). Social History:  reports that she has never smoked. She has never used smokeless tobacco. She reports that she does not drink alcohol and does not use drugs. Family History: family history is not on file. The patients home medications have been reviewed. Allergies: Tape [adhesive tape]    -------------------------------------------------- RESULTS -------------------------------------------------  All laboratory and radiology results have been personally reviewed by myself   LABS:  Results for orders placed or performed during the hospital encounter of 09/05/21   COVID-19, Rapid    Specimen: Nasopharyngeal Swab   Result Value Ref Range    Specimen Description . NASOPHARYNGEAL SWAB     SARS-CoV-2, Rapid DETECTED (A) Not Detected       RADIOLOGY:  Interpreted by Radiologist.  XR CHEST PORTABLE   Final Result      No acute cardiopulmonary findings.       X-ray penetration is limited by body habitus.             ------------------------- NURSING NOTES AND VITALS REVIEWED ---------------------------   The nursing notes within the ED encounter and vital signs as below have been reviewed. /84   Pulse 96   Temp 101.1 °F (38.4 °C) (Oral)   Resp 18   Ht 5' 2\" (1.575 m)   Wt (!) 303 lb (137.4 kg)   LMP  (LMP Unknown)   SpO2 94%   BMI 55.42 kg/m²   Oxygen Saturation Interpretation: Normal      ---------------------------------------------------PHYSICAL EXAM--------------------------------------      Constitutional/General: Alert and oriented x3, mildly ill appearing, non toxic in NAD  Head: NC/AT  Eyes: PERRL, EOMI  Mouth: Oropharynx clear, handling secretions, no trismus  Neck: Supple, full ROM, no meningeal signs  Pulmonary: Lungs clear to auscultation bilaterally, no wheezes, rales, or rhonchi. Not in respiratory distress  Cardiovascular:  Regular rate and rhythm, no murmurs, gallops, or rubs. 2+ distal pulses  Abdomen: Soft, non tender, non distended,   Extremities: Moves all extremities x 4. Warm and well perfused  Skin: warm and dry without rash  Neurologic: GCS 15,  Psych: Normal Affect      ------------------------------ ED COURSE/MEDICAL DECISION MAKING----------------------  Medications   0.9 % sodium chloride bolus (1,000 mLs IntraVENous New Bag 9/5/21 1708)   albuterol sulfate  (90 Base) MCG/ACT inhaler 2 puff (2 puffs Inhalation Given 9/5/21 1713)   ketorolac (TORADOL) injection 15 mg (15 mg IntraVENous Given 9/5/21 1708)         Medical Decision Making:    Suspected Covid    Counseling: The emergency provider has spoken with the patient and discussed todays results, in addition to providing specific details for the plan of care and counseling regarding the diagnosis and prognosis.   Questions are answered at this time and they are agreeable with the plan.      --------------------------------- IMPRESSION AND DISPOSITION ---------------------------------    IMPRESSION  1. COVID-19 New Problem       DISPOSITION  Disposition: Discharge to home  Patient condition is stable                  Honorio Carroll MD  09/05/21 [id number]

## 2021-09-05 NOTE — LETTER
NOTIFICATION RETURN TO WORK / SCHOOL    9/5/2021    Ms. Raheem Sheikh  [address] [place] [address]      To Whom It May Concern:    Raheem Sheikh was tested for COVID-19 on 9/5, and the result was positive. She may return to work in 10 days and/or after being cleared by her PCP. If there are questions or concerns, please call.         Sincerely,      Melia Negrete RN

## 2021-09-05 NOTE — LETTER
NOTIFICATION RETURN TO WORK / SCHOOL    9/5/2021    Ms. Gregory Samayoa  [address] [place] [address]      To Whom It May Concern:    Gregory Samayoa was tested for COVID-19 on 9/5, and the result was negative. She may return to work after 10 days and/or after being cleared by her PCP. If there are questions or concerns, please call.         Sincerely,      Lila Webster RN

## 2021-09-07 ENCOUNTER — CARE COORDINATION (OUTPATIENT)
Dept: CARE COORDINATION | Age: 61
End: 2021-09-07

## 2021-09-07 NOTE — CARE COORDINATION
Spoke with  who was also in ED and has COVID. She is feeling slightly better but still with nausea, diarrhea, fatigue, aches, cough. Able to drink. Prescribed meds for nausea, cough, aches in ED. She has been in contact with PCP office and will call back in 2 days with update. Discussed encouraging oral fluids, symptoms that require return to ED. Patient contacted regarding COVID-19 risk, exposure, pulse oximeter ordered at discharge and monoclonal antibody infusion follow up. Discussed COVID-19 related testing which was available at this time. Test results were positive. Patient informed of results, if available? Yes. Ambulatory Care Manager contacted the family by telephone to perform post discharge assessment. Call within 2 business days of discharge: Yes. Verified name and  with family as identifiers. Provided introduction to self, and explanation of the CTN/ACM role, and reason for call due to risk factors for infection and/or exposure to COVID-19. Symptoms reviewed with patient who verbalized the following symptoms: fever, fatigue, pain or aching joints, cough, shortness of breath, nausea, diarrhea, no new symptoms and no worsening symptoms. Due to no new or worsening symptoms encounter was not routed to provider for escalation. Discussed follow-up appointments. If no appointment was previously scheduled, appointment scheduling offered: No.  [facility] follow up appointment(s): No future appointments. Non-[facility] follow up appointment(s): NA    Non-face-to-face services provided:  Education of patient/family/caregiver/guardian to support self-management-symptom monitoring and treatment     Advance Care Planning:   Does patient have an Advance Directive:  did not review. Educated patient about risk for severe COVID-19 due to risk factors according to CDC guidelines. ACM reviewed discharge instructions, medical action plan and red flag symptoms with the family who verbalized understanding. Discussed COVID vaccination status: Yes. Education provided on COVID-19 vaccination as appropriate. Discussed exposure protocols and quarantine with CDC Guidelines. Family was given an opportunity to verbalize any questions and concerns and agrees to contact ACM or health care provider for questions related to their healthcare. Reviewed and educated family on any new and changed medications related to discharge diagnosis     Was patient discharged with a pulse oximeter? No Discussed and confirmed pulse oximeter discharge instructions and when to notify provider or seek emergency care. ACM provided contact information. Plan for follow-up call in 5-7 days based on severity of symptoms and risk factors.

## 2021-09-07 NOTE — CARE COORDINATION
Left VM message asking patient to call me back at [phone] for ED/COVID outreach.  also went to ED for COVID, left message on his phone also. This is first attempt.

## 2021-09-13 ENCOUNTER — CARE COORDINATION (OUTPATIENT)
Dept: CARE COORDINATION | Age: 61
End: 2021-09-13

## 2022-02-20 ENCOUNTER — HOSPITAL ENCOUNTER (OUTPATIENT)
Age: 62
Discharge: HOME | End: 2022-02-20
Payer: COMMERCIAL

## 2022-02-20 LAB
ALBUMIN SERPL-MCNC: 4.2 G/DL (ref 3.5–5.2)
ALBUMIN/GLOBULIN RATIO: ABNORMAL (ref 1–2.5)
ALP BLD-CCNC: 81 U/L (ref 35–104)
ALT SERPL-CCNC: 16 U/L (ref 5–33)
ANION GAP SERPL CALCULATED.3IONS-SCNC: 11 MMOL/L (ref 9–17)
AST SERPL-CCNC: 18 U/L
BILIRUB SERPL-MCNC: 0.29 MG/DL (ref 0.3–1.2)
BUN BLDV-MCNC: 17 MG/DL (ref 8–23)
BUN/CREAT BLD: 23 (ref 9–20)
CALCIUM SERPL-MCNC: 9.3 MG/DL (ref 8.6–10.4)
CHLORIDE BLD-SCNC: 102 MMOL/L (ref 98–107)
CHOLESTEROL/HDL RATIO: 2.1
CHOLESTEROL: 162 MG/DL
CO2: 26 MMOL/L (ref 20–31)
CREAT SERPL-MCNC: 0.74 MG/DL (ref 0.5–0.9)
GFR AFRICAN AMERICAN: >60 ML/MIN
GFR NON-AFRICAN AMERICAN: >60 ML/MIN
GFR SERPL CREATININE-BSD FRML MDRD: ABNORMAL ML/MIN/{1.73_M2}
GFR SERPL CREATININE-BSD FRML MDRD: ABNORMAL ML/MIN/{1.73_M2}
GLUCOSE BLD-MCNC: 101 MG/DL (ref 70–99)
HDLC SERPL-MCNC: 76 MG/DL
LDL CHOLESTEROL: 70 MG/DL (ref 0–130)
PATIENT FASTING?: YES
POTASSIUM SERPL-SCNC: 4.3 MMOL/L (ref 3.7–5.3)
SODIUM BLD-SCNC: 139 MMOL/L (ref 135–144)
TOTAL PROTEIN: 7 G/DL (ref 6.4–8.3)
TRIGL SERPL-MCNC: 82 MG/DL
TSH SERPL DL<=0.05 MIU/L-ACNC: 2.88 MIU/L (ref 0.3–5)
VLDLC SERPL CALC-MCNC: NORMAL MG/DL (ref 1–30)

## 2022-02-20 PROCEDURE — 83036 HEMOGLOBIN GLYCOSYLATED A1C: CPT

## 2022-02-20 PROCEDURE — 80053 COMPREHEN METABOLIC PANEL: CPT

## 2022-02-20 PROCEDURE — 36415 COLL VENOUS BLD VENIPUNCTURE: CPT

## 2022-02-20 PROCEDURE — 80061 LIPID PANEL: CPT

## 2022-02-20 PROCEDURE — 84443 ASSAY THYROID STIM HORMONE: CPT

## 2022-02-21 LAB
ESTIMATED AVERAGE GLUCOSE: 123 MG/DL
HBA1C MFR BLD: 5.9 % (ref 4–6)

## 2022-06-16 ENCOUNTER — OFFICE VISIT (OUTPATIENT)
Dept: PRIMARY CARE CLINIC | Age: 62
End: 2022-06-16
Payer: COMMERCIAL

## 2022-06-16 VITALS
TEMPERATURE: 99.5 F | OXYGEN SATURATION: 97 % | RESPIRATION RATE: 16 BRPM | WEIGHT: 293 LBS | SYSTOLIC BLOOD PRESSURE: 122 MMHG | HEART RATE: 94 BPM | DIASTOLIC BLOOD PRESSURE: 84 MMHG | BODY MASS INDEX: 53.77 KG/M2

## 2022-06-16 DIAGNOSIS — J02.9 SORE THROAT: ICD-10-CM

## 2022-06-16 DIAGNOSIS — J03.90 TONSILLITIS WITH EXUDATE: Primary | ICD-10-CM

## 2022-06-16 PROBLEM — N81.4 UTERINE PROLAPSE: Status: ACTIVE | Noted: 2020-02-26

## 2022-06-16 PROBLEM — E78.5 HYPERLIPIDEMIA: Status: ACTIVE | Noted: 2022-06-16

## 2022-06-16 PROBLEM — M65.9 SYNOVITIS OF RIGHT ANKLE: Status: ACTIVE | Noted: 2021-02-24

## 2022-06-16 PROBLEM — I10 ESSENTIAL HYPERTENSION, BENIGN: Status: ACTIVE | Noted: 2022-06-16

## 2022-06-16 PROBLEM — Z96.651 HISTORY OF RIGHT KNEE JOINT REPLACEMENT: Status: ACTIVE | Noted: 2020-08-04

## 2022-06-16 PROBLEM — M65.971 SYNOVITIS OF RIGHT ANKLE: Status: ACTIVE | Noted: 2021-02-24

## 2022-06-16 PROBLEM — E66.01 MORBID OBESITY (HCC): Status: ACTIVE | Noted: 2022-06-16

## 2022-06-16 PROBLEM — Z96.652 HISTORY OF TOTAL KNEE ARTHROPLASTY, LEFT: Status: ACTIVE | Noted: 2020-02-26

## 2022-06-16 PROBLEM — M54.50 LOW BACK PAIN: Status: ACTIVE | Noted: 2020-08-04

## 2022-06-16 LAB — S PYO AG THROAT QL: NORMAL

## 2022-06-16 PROCEDURE — 87880 STREP A ASSAY W/OPTIC: CPT | Performed by: NURSE PRACTITIONER

## 2022-06-16 PROCEDURE — 99202 OFFICE O/P NEW SF 15 MIN: CPT | Performed by: NURSE PRACTITIONER

## 2022-06-16 RX ORDER — ASCORBIC ACID 250 MG
TABLET ORAL
COMMUNITY

## 2022-06-16 RX ORDER — PENICILLIN V POTASSIUM 500 MG/1
500 TABLET ORAL 2 TIMES DAILY
Qty: 20 TABLET | Refills: 0 | Status: SHIPPED | OUTPATIENT
Start: 2022-06-16 | End: 2022-06-26

## 2022-06-16 ASSESSMENT — ENCOUNTER SYMPTOMS
SORE THROAT: 1
NAUSEA: 0
WHEEZING: 0
SHORTNESS OF BREATH: 0
RHINORRHEA: 0
VOMITING: 0
COUGH: 1
DIARRHEA: 1
SINUS PAIN: 0

## 2022-06-16 NOTE — PROGRESS NOTES
[address] [facility]  [address] [place] [id number]  Dept: [phone]  Dept Fax: [phone]    Mauro Randle is a 64 y.o. female who presents to the [facility] today for hermedical conditions/complaints as noted below. Mauro Randle is c/o of URI (Sore throat, cough, congestion, fever and fatigue)      HPI:     URI   This is a new problem. The current episode started in the past 7 days (Started on Saturday with dry cough, congestion, sore throat and tactile fever. Denies known exposure to Covid-19 or influenza. ). The problem has been gradually worsening. Maximum temperature: Tactile fever. Associated symptoms include congestion, coughing (Dry cough), diarrhea, headaches and a sore throat. Pertinent negatives include no ear pain, nausea, rash, rhinorrhea, sinus pain, vomiting or wheezing. Associated symptoms comments: Fever, chills, sweats and fatigue. . Treatments tried: Nyquil and Vicks, sore throat medicine and lozenge. The treatment provided no relief. Past Medical History:   Diagnosis Date    Arthritis     Back pain     GERD (gastroesophageal reflux disease)     Hypertension         Current Outpatient Medications   Medication Sig Dispense Refill    penicillin v potassium (VEETID) 500 MG tablet Take 1 tablet by mouth 2 times daily for 10 days 20 tablet 0    Omega-3 Fatty Acids (FISH OIL) 1000 MG CAPS Take 1 capsule by mouth daily      albuterol sulfate HFA (VENTOLIN HFA) 108 (90 Base) MCG/ACT inhaler Inhale 2 puffs into the lungs 4 times daily as needed for Wheezing 18 g 0    ketorolac (TORADOL) 10 MG tablet Take 1 tablet by mouth every 8 hours as needed for Pain 15 tablet 0    predniSONE (DELTASONE) 10 MG tablet 6 for 2 days, 5 for 2 days, 4 for 2 days, 3 for 2 days, 2 for 2 days, one for 2 days.  42 tablet 0    aspirin 81 MG tablet Take 81 mg by mouth daily      losartan (COZAAR) 50 MG tablet Take 50 mg by mouth daily      Cyanocobalamin (VITAMIN B-12 CR) 1500 MCG TBCR Take 1,500 mcg by mouth daily.  traMADol (ULTRAM) 50 MG tablet Take 50 mg by mouth every 6 hours as needed for Pain.  ascorbic acid (V-R VITAMIN C) 250 MG tablet Take by mouth      Ferrous Sulfate Dried (FERROUS SULFATE IRON PO) Take by mouth       No current facility-administered medications for this visit. Allergies   Allergen Reactions    Tape Ussyel Delcidjohnny Tape] Other (See Comments)     Mild sometimes       :     Review of Systems   Constitutional: Positive for appetite change (Difficulty eating and drinking due to sore throat.), chills, diaphoresis, fatigue and fever (Tactile.). HENT: Positive for congestion and sore throat. Negative for ear pain, rhinorrhea and sinus pain. Respiratory: Positive for cough (Dry cough). Negative for shortness of breath and wheezing. Gastrointestinal: Positive for diarrhea. Negative for nausea and vomiting. Skin: Negative for rash and wound. Neurological: Positive for headaches. Negative for dizziness.       :     Physical Exam  Vitals and nursing note reviewed. Constitutional:       General: She is not in acute distress. Appearance: Normal appearance. She is well-developed. She is not ill-appearing or diaphoretic. Comments: Well hydrated, nontoxic appearance. HENT:      Head: Normocephalic and atraumatic. Right Ear: Hearing, tympanic membrane, ear canal and external ear normal.      Left Ear: Hearing, tympanic membrane, ear canal and external ear normal.      Nose: Congestion present. No mucosal edema or rhinorrhea. Right Sinus: No maxillary sinus tenderness or frontal sinus tenderness. Left Sinus: No maxillary sinus tenderness or frontal sinus tenderness. Mouth/Throat:      Lips: Pink. Mouth: Mucous membranes are moist.      Pharynx: Uvula midline. Pharyngeal swelling, oropharyngeal exudate and posterior oropharyngeal erythema present.  No uvula swelling. Tonsils: Tonsillar exudate (Large patch of whitish yellow exudate to right inner tonsil.) present. No tonsillar abscesses. 2+ on the right. 2+ on the left. Eyes:      Conjunctiva/sclera: Conjunctivae normal.      Pupils: Pupils are equal, round, and reactive to light. Cardiovascular:      Rate and Rhythm: Normal rate and regular rhythm. Heart sounds: Normal heart sounds, S1 normal and S2 normal. No murmur heard. No friction rub. No gallop. Pulmonary:      Effort: Pulmonary effort is normal. No accessory muscle usage or respiratory distress. Breath sounds: Normal breath sounds and air entry. No decreased breath sounds, wheezing, rhonchi or rales. Comments: Occasional dry cough. Breath sounds clear B/L anterior and posterior lobes. Chest expansion symmetrical.  No audible wheezing or respiratory distress. No rales or rhonchi. Abdominal:      General: Bowel sounds are normal.      Palpations: Abdomen is soft. Tenderness: There is no abdominal tenderness. Musculoskeletal:         General: Normal range of motion. Lymphadenopathy:      Cervical: Cervical adenopathy present. Right cervical: Superficial cervical adenopathy present. No posterior cervical adenopathy. Left cervical: Superficial cervical adenopathy present. No posterior cervical adenopathy. Skin:     General: Skin is warm and dry. Coloration: Skin is not pale. Findings: No erythema or rash. Neurological:      Mental Status: She is alert and oriented to person, place, and time. Psychiatric:         Behavior: Behavior normal. Behavior is cooperative.        /84   Pulse 94   Temp 99.5 °F (37.5 °C)   Resp 16   Wt 294 lb (133.4 kg)   LMP  (LMP Unknown)   SpO2 97%   BMI 53.77 kg/m²     Results for orders placed or performed in visit on 06/16/22   POCT rapid strep A   Result Value Ref Range    Strep A Ag None Detected None Detected   Centor Score:  +1, will treat for tonsillitis.    :      Diagnosis Orders   1. Tonsillitis with exudate  penicillin v potassium (VEETID) 500 MG tablet   2. Sore throat  POCT rapid strep A       :      Return if symptoms worsen or fail to improve, for Resume all previous medications as directed. Orders Placed This Encounter   Medications    penicillin v potassium (VEETID) 500 MG tablet     Sig: Take 1 tablet by mouth 2 times daily for 10 days     Dispense:  20 tablet     Refill:  0      · DO NOT return to work until on antibiotic for 24 hours  · Start using a new toothbrush after 24 hours on antibiotic therapy  · Avoid kissing, sharing utensils or food until infection has resolved  · Practice meticulous handwashing to prevent spread of infection  · Continue antibiotic as prescribed until all doses are completed  · Probiotic OTC or greek yogurt daily while on antibiotic  · Tylenol/Ibuprofen OTC PRN as directed on package for pain, discomfort or fever  · Encouraged to increase fluids and rest  · Avoid salty, spicy or acidic foods or beverages  · Soft diet until sore throat subsides  · Warm salt water gargles for sore throat  · Cepacol lozenges, chloraseptic spray OTC q 1-2 hrs PRN for sore throat  · Patient instructions given for tonsillitis and penicillin v.  · To ER or call 911 if any difficulty breathing, shortness of breath, inability to swallow, hives, rash, facial/tongue swelling or temp greater than 103 degrees. · Follow up with PCP or Walk in Care as needed if symptoms worsen or do not improve. Heaven Lori received counseling on the following healthy behaviors: medication adherence. Patient given educational materials - see patient instructions. Discussed use, benefit, and side effects of prescribed medications. Treatment plan discussed at visit. Continue routine health care follow up. All patient questions answered. Pt voiced understanding.       Electronically signed by MILENA De León CNP on 6/16/2022 at 12:35 PM

## 2022-06-16 NOTE — LETTER
[facility] [address]  Phone: [phone]  Fax: Josr Caruso, APRN - CNP        June 16, 2022     Patient: Fely Ramirez   YOB: 1960   Date of Visit: 6/16/2022       To Whom it May Concern:    Fely Ramirez was seen in my clinic on 6/16/2022. She may return to work on 06/20/2022. Please excuse for 06/15/2022, 06/16/2022 and 06/17/2022. If you have any questions or concerns, please don't hesitate to call.     Sincerely,         Rivera Gabriel, MILENA - CNP

## 2022-06-16 NOTE — PATIENT INSTRUCTIONS
Patient Education        Tonsillitis: Care Instructions  Overview     Tonsillitis is an infection of the tonsils that is caused by bacteria or a virus. The tonsils are in the back of the throat and are part of the immunesystem. Tonsillitis typically lasts from a few days up to a couple of weeks. Tonsillitis caused by a virus goes away on its own. Tonsillitis caused by thebacteria that causes strep throat is treated with antibiotics. You and your doctor may consider surgery to remove the tonsils (tonsillectomy)if you have serious complications or repeat infections. Follow-up care is a key part of your treatment and safety. Be sure to make and go to all appointments, and call your doctor if you are having problems. It's also a good idea to know your test results and keep alist of the medicines you take. How can you care for yourself at home? Home care can help with a sore throat and other symptoms. Here are some thingsyou can do to help yourself feel better.  If your doctor prescribed antibiotics, take them as directed. Do not stop taking them just because you feel better. You need to take the full course of antibiotics.  Gargle with warm salt water. This helps reduce swelling and relieve discomfort. Gargle once an hour with 1 teaspoon of salt mixed in 8 fluid ounces of warm water.  Take an over-the-counter pain medicine, such as acetaminophen (Tylenol), ibuprofen (Advil, Motrin), or naproxen (Aleve). Be safe with medicines. Read and follow all instructions on the label. No one younger than 20 should take aspirin. It has been linked to Reye syndrome, a serious illness.  Be careful when taking over-the-counter cold or flu medicines and Tylenol at the same time. Many of these medicines have acetaminophen, which is Tylenol. Read the labels to make sure that you are not taking more than the recommended dose. Too much acetaminophen (Tylenol) can be harmful.    Try lozenges or an over-the-counter throat spray to relieve throat pain.  Drink plenty of fluids. Fluids may help soothe an irritated throat. Drink warm or cool liquids (whichever feels better). These include tea, soup, and juice.  Do not smoke, and avoid secondhand smoke. Smoking can make tonsillitis worse. If you need help quitting, talk to your doctor about stop-smoking programs and medicines. These can increase your chances of quitting for good.  Use a vaporizer or humidifier to add moisture to your bedroom. Follow the directions for cleaning the machine.  Get plenty of rest.  When should you call for help? Call your doctor now or seek immediate medical care if:     Your pain gets worse on one side of your throat.      You have a new or higher fever.      You notice changes in your voice.      You have trouble opening your mouth.      You have any trouble breathing.      You have much more trouble swallowing.      You have a fever with a stiff neck or a severe headache.      You are sensitive to light or feel very sleepy or confused. Watch closely for changes in your health, and be sure to contact your doctor if:     You do not get better after 2 days. Where can you learn more? Go to https://[email or web address].[email or web address]. org and sign in to your [employer] account. Enter F075 in the [facility] box to learn more about \"Tonsillitis: Care Instructions. \"     If you do not have an account, please click on the \"Sign Up Now\" link. Current as of: September 8, 2021               Content Version: 13.2  © 2006-2022 Healthwise, [facility]. Care instructions adapted under license by [facility] ([facility]). If you have questions about a medical condition or this instruction, always ask your healthcare professional. [place] [address] any warranty or liability for your use of this information.        Patient Education        penicillin V potassium (oral)  Pronunciation: PEN i TAY in V alonso TAS ee um  What is the most important information I should know about penicillin V potassium? You should not be treated with this medicine if you are allergic to penicillin. What is penicillin V potassium? Penicillin V potassium is a slow-onset antibiotic that is used to treat many types of mild to moderate infections caused by bacteria, including scarlet fever, pneumonia, skin infections, and infections affecting the nose, mouth, or throat. Penicillin V potassium is also used to prevent the symptoms ofrheumatic fever. Penicillin V potassium is also used to prevent infections of the heart valvesin people with certain heart conditions who need to have dental work or surgery. Penicillin V potassium may also be used for purposes not listed in thismedication guide. What should I discuss with my healthcare provider before taking penicillin V potassium? You should not be treated with this medicine if you are allergic to penicillin. Tell your doctor if you have ever had:   an allergic reaction to a cephalosporin antibiotic (Keflex, Omnicef, and others);   any type of allergy;   asthma or breathing problems;   a stomach or intestinal disorder;   heart disease; or   kidney disease. If you have stomach problems or are sick with severe vomiting or diarrhea, yourmedication may not be as effective. Penicillin V potassium oral liquid may contain phenylalanine. Tell your doctor if you have phenylketonuria (PKU). Tell your doctor if you are pregnant or breastfeeding. Do not give this medicine to a child without medical advice. How should I take penicillin V potassium? Follow all directions on your prescription label and read all medication guidesor instruction sheets. Use the medicine exactly as directed. You may take penicillin V potassium with or without food. Shake the oral liquid before you measure a dose. Use the dosing syringe provided, or use a medicinedose-measuring device (not a kitchen spoon).   When given before surgery or dental work, penicillin V potassium is usually taken 1 hour before and 6 hours after the procedure. Follow your doctor's dosing instructions very carefully. Use this medicine for the full prescribed length of time, even if your symptoms quickly improve. Skipping doses can increase your risk of infection that is resistant to medication. Penicillin V potassium will not treat a viralinfection such as the flu or a common cold. After you have finished all doses, your doctor may want to do tests to [facility] your infection has completely cleared up. Store the tablets at room temperature away from moisture, heat, and light. Store the liquid in a refrigerator. Do not freeze. Throw away any unused liquid after 14 days. What happens if I miss a dose? Take the medicine as soon as you can, but skip the missed dose if it is almost time for your next dose. Do not take two doses at one time. What happens if I overdose? Seek emergency medical attention or call the Poison Help line at 1-402.520.5892. What should I avoid while taking penicillin V potassium? Do not share this medicine with another person, even if they have the same symptoms you have. Antibiotic medicines can cause diarrhea, which may be a sign of a new infection. If you have diarrhea that is watery or bloody, call your doctor before using anti-diarrhea medicine. What are the possible side effects of penicillin V potassium? Get emergency medical help if you have signs of an allergic reaction: hives; fever, chills, joint pain; difficult breathing; swelling of your face,lips, tongue, or throat. Call your doctor at once if you have:   severe stomach pain, diarrhea that is watery or bloody (even if it occurs months after your last dose);   easy bruising or bleeding;   pale or yellowed skin, dark colored urine;   numbness, tingling, or burning pain;   urination problems; or   fever, swollen glands, itching, joint pain, or not feeling well.   Common side effects may include:   nausea, vomiting, upset stomach;   diarrhea;   swollen, black, or \"hairy\" tongue;   rash; or   vaginal itching or discharge. This is not a complete list of side effects and others may occur. Call your doctor for medical advice about side effects. You may report side effects toFDA at [phone]-VEV-[phone]. What other drugs will affect penicillin V potassium? Penicillin V potassium can make birth control pills less effective. Ask your doctor about using a non-hormonal birth control (condom, diaphragm withspermicide) to prevent pregnancy. Other drugs may affect penicillin V potassium, including prescription and over-the-counter medicines, vitamins, and herbal products. Tell your doctorabout all your current medicines and any medicine you start or stop using. Where can I get more information? Your doctor or pharmacist can provide more information about penicillin Vpotassium. Remember, keep this and all other medicines out of the reach of children, never share your medicines with others, and use this medication only for the indication prescribed. Every effort has been made to ensure that the information provided by [address] is accurate, up-to-date, and complete, but no guarantee is made to that effect. Drug information contained herein may be time sensitive. [facility] information has been compiled for use by healthcare practitioners and consumers in the [place] and therefore [facility] does not warrant that uses outside of the [place] are appropriate, unless specifically indicated otherwise. [facility] drug information does not endorse drugs, diagnose patients or recommend therapy.  [facility] drug information is an informational resource designed to assist licensed healthcare practitioners in caring for their patients and/or to serve consumers viewing this service as a supplement to, and not a substitute for, the expertise, skill, knowledge and judgment of healthcare practitioners. The absence of a warning for a given drug or drug combination in no way should be construed to indicate that the drug or drug combination is safe, effective or appropriate for any given patient. [facility] does not assume any responsibility for any aspect of healthcare administered with the aid of information [facility] provides. The information contained herein is not intended to cover all possible uses, directions, precautions, warnings, drug interactions, allergic reactions, or adverse effects. If you have questions about the drugs you are taking, check with yourdoctor, nurse or pharmacist.  Copyright 7086-2253 [address]. Version: 2.01. Revision date: 9/16/2019. Care instructions adapted under license by [facility] ([facility]). If you have questions about a medical condition or this instruction, always ask your healthcare professional. [place] [address] any warranty or liability for your use of this information. · DO NOT return to work until on antibiotic for 24 hours  · Start using a new toothbrush after 24 hours on antibiotic therapy  · Avoid kissing, sharing utensils or food until infection has resolved  · Practice meticulous handwashing to prevent spread of infection  · Continue antibiotic as prescribed until all doses are completed  · Probiotic OTC or greek yogurt daily while on antibiotic  · Tylenol/Ibuprofen OTC PRN as directed on package for pain, discomfort or fever  · Encouraged to increase fluids and rest  · Avoid salty, spicy or acidic foods or beverages  · Soft diet until sore throat subsides  · Warm salt water gargles for sore throat  · Cepacol lozenges, chloraseptic spray OTC q 1-2 hrs PRN for sore throat  · Patient instructions given for tonsillitis and penicillin v.  · To ER or call 911 if any difficulty breathing, shortness of breath, inability to swallow, hives, rash, facial/tongue swelling or temp greater than 103 degrees.   · Follow up with PCP or [facility] as needed if symptoms worsen or do not improve.

## 2022-09-12 ENCOUNTER — HOSPITAL ENCOUNTER (OUTPATIENT)
Age: 62
Discharge: HOME | End: 2022-09-12
Payer: COMMERCIAL

## 2022-09-12 LAB
ABSOLUTE EOS #: 0.2 K/UL (ref 0–0.4)
ABSOLUTE LYMPH #: 1.3 K/UL (ref 1–4.8)
ABSOLUTE MONO #: 0.3 K/UL (ref 0–1)
ALBUMIN SERPL-MCNC: 4.1 G/DL (ref 3.5–5.2)
ALP BLD-CCNC: 73 U/L (ref 35–104)
ALT SERPL-CCNC: 15 U/L (ref 5–33)
ANION GAP SERPL CALCULATED.3IONS-SCNC: 9 MMOL/L (ref 9–17)
AST SERPL-CCNC: 17 U/L
BASOPHILS # BLD: 1 % (ref 0–2)
BASOPHILS ABSOLUTE: 0 K/UL (ref 0–0.2)
BILIRUB SERPL-MCNC: 0.4 MG/DL (ref 0.3–1.2)
BUN BLDV-MCNC: 16 MG/DL (ref 8–23)
BUN/CREAT BLD: 23 (ref 9–20)
CALCIUM SERPL-MCNC: 9.5 MG/DL (ref 8.6–10.4)
CHLORIDE BLD-SCNC: 104 MMOL/L (ref 98–107)
CHOLESTEROL/HDL RATIO: 2.5
CHOLESTEROL: 168 MG/DL
CO2: 28 MMOL/L (ref 20–31)
CREAT SERPL-MCNC: 0.69 MG/DL (ref 0.5–0.9)
DIFFERENTIAL TYPE: YES
EOSINOPHILS RELATIVE PERCENT: 5 % (ref 0–5)
ESTIMATED AVERAGE GLUCOSE: 111 MG/DL
GFR AFRICAN AMERICAN: >60 ML/MIN
GFR NON-AFRICAN AMERICAN: >60 ML/MIN
GFR SERPL CREATININE-BSD FRML MDRD: ABNORMAL ML/MIN/{1.73_M2}
GLUCOSE BLD-MCNC: 100 MG/DL (ref 70–99)
HBA1C MFR BLD: 5.5 % (ref 4–6)
HCT VFR BLD CALC: 36.3 % (ref 36–46)
HDLC SERPL-MCNC: 68 MG/DL
HEMOGLOBIN: 12 G/DL (ref 12–16)
LDL CHOLESTEROL: 82 MG/DL (ref 0–130)
LYMPHOCYTES # BLD: 33 % (ref 15–40)
MCH RBC QN AUTO: 28.5 PG (ref 26–34)
MCHC RBC AUTO-ENTMCNC: 33 G/DL (ref 31–37)
MCV RBC AUTO: 86.6 FL (ref 80–100)
MONOCYTES # BLD: 8 % (ref 4–8)
PATIENT FASTING?: YES
PDW BLD-RTO: 15.9 % (ref 12.1–15.2)
PLATELET # BLD: 256 K/UL (ref 140–450)
POTASSIUM SERPL-SCNC: 4.5 MMOL/L (ref 3.7–5.3)
RBC # BLD: 4.19 M/UL (ref 4–5.2)
SEG NEUTROPHILS: 53 % (ref 47–75)
SEGMENTED NEUTROPHILS ABSOLUTE COUNT: 2.1 K/UL (ref 2.5–7)
SODIUM BLD-SCNC: 141 MMOL/L (ref 135–144)
TOTAL PROTEIN: 7 G/DL (ref 6.4–8.3)
TRIGL SERPL-MCNC: 92 MG/DL
WBC # BLD: 4 K/UL (ref 3.5–11)

## 2022-09-12 PROCEDURE — 36415 COLL VENOUS BLD VENIPUNCTURE: CPT

## 2022-09-12 PROCEDURE — 80053 COMPREHEN METABOLIC PANEL: CPT

## 2022-09-12 PROCEDURE — 85025 COMPLETE CBC W/AUTO DIFF WBC: CPT

## 2022-09-12 PROCEDURE — 83036 HEMOGLOBIN GLYCOSYLATED A1C: CPT

## 2022-09-12 PROCEDURE — 80061 LIPID PANEL: CPT

## 2023-02-28 ENCOUNTER — HOSPITAL ENCOUNTER (OUTPATIENT)
Age: 63
Discharge: HOME | End: 2023-02-28
Payer: COMMERCIAL

## 2023-02-28 LAB
ABSOLUTE EOS #: 0.2 K/UL (ref 0–0.4)
ABSOLUTE LYMPH #: 1.4 K/UL (ref 1–4.8)
ABSOLUTE MONO #: 0.4 K/UL (ref 0–1)
BASOPHILS # BLD: 0 % (ref 0–2)
BASOPHILS ABSOLUTE: 0 K/UL (ref 0–0.2)
CHOLEST SERPL-MCNC: 194 MG/DL
CHOLESTEROL/HDL RATIO: 2.7
DIFFERENTIAL TYPE: YES
EOSINOPHILS RELATIVE PERCENT: 4 % (ref 0–5)
EST. AVERAGE GLUCOSE BLD GHB EST-MCNC: 114 MG/DL
FERRITIN SERPL-MCNC: 348 NG/ML (ref 13–150)
HBA1C MFR BLD: 5.6 % (ref 4–6)
HCT VFR BLD AUTO: 39 % (ref 36–46)
HDLC SERPL-MCNC: 73 MG/DL
HGB BLD-MCNC: 13 G/DL (ref 12–16)
LDLC SERPL CALC-MCNC: 90 MG/DL (ref 0–130)
LYMPHOCYTES # BLD: 24 % (ref 15–40)
MCH RBC QN AUTO: 29 PG (ref 26–34)
MCHC RBC AUTO-ENTMCNC: 33.2 G/DL (ref 31–37)
MCV RBC AUTO: 87.4 FL (ref 80–100)
MONOCYTES # BLD: 7 % (ref 4–8)
PDW BLD-RTO: 14.5 % (ref 12.1–15.2)
PLATELET # BLD AUTO: 266 K/UL (ref 140–450)
RBC # BLD: 4.47 M/UL (ref 4–5.2)
SEG NEUTROPHILS: 65 % (ref 47–75)
SEGMENTED NEUTROPHILS ABSOLUTE COUNT: 3.7 K/UL (ref 2.5–7)
TRIGL SERPL-MCNC: 154 MG/DL
WBC # BLD AUTO: 5.7 K/UL (ref 3.5–11)

## 2023-02-28 PROCEDURE — 82728 ASSAY OF FERRITIN: CPT

## 2023-02-28 PROCEDURE — 80061 LIPID PANEL: CPT

## 2023-02-28 PROCEDURE — 85025 COMPLETE CBC W/AUTO DIFF WBC: CPT

## 2023-02-28 PROCEDURE — 36415 COLL VENOUS BLD VENIPUNCTURE: CPT

## 2023-02-28 PROCEDURE — 83036 HEMOGLOBIN GLYCOSYLATED A1C: CPT

## 2023-11-12 ENCOUNTER — HOSPITAL ENCOUNTER (OUTPATIENT)
Age: 63
Discharge: HOME | End: 2023-11-12
Payer: COMMERCIAL

## 2023-11-12 LAB
ALBUMIN SERPL-MCNC: 4.2 G/DL (ref 3.5–5.2)
ALP SERPL-CCNC: 82 U/L (ref 35–104)
ALT SERPL-CCNC: 25 U/L (ref 5–33)
ANION GAP SERPL CALCULATED.3IONS-SCNC: 14 MMOL/L (ref 9–17)
AST SERPL-CCNC: 24 U/L
BASOPHILS # BLD: 0.04 K/UL (ref 0–0.2)
BASOPHILS NFR BLD: 1 % (ref 0–2)
BILIRUB SERPL-MCNC: 0.4 MG/DL (ref 0.3–1.2)
BUN SERPL-MCNC: 18 MG/DL (ref 8–23)
BUN/CREAT SERPL: 23 (ref 9–20)
CALCIUM SERPL-MCNC: 9.8 MG/DL (ref 8.6–10.4)
CHLORIDE SERPL-SCNC: 100 MMOL/L (ref 98–107)
CHOLEST SERPL-MCNC: 199 MG/DL
CHOLESTEROL/HDL RATIO: 2.8
CO2 SERPL-SCNC: 23 MMOL/L (ref 20–31)
CREAT SERPL-MCNC: 0.8 MG/DL (ref 0.5–0.9)
EOSINOPHIL # BLD: 0.25 K/UL (ref 0–0.4)
EOSINOPHILS RELATIVE PERCENT: 5 % (ref 0–5)
ERYTHROCYTE [DISTWIDTH] IN BLOOD BY AUTOMATED COUNT: 14.6 % (ref 12.1–15.2)
FERRITIN SERPL-MCNC: 361 NG/ML (ref 13–150)
GFR SERPL CREATININE-BSD FRML MDRD: >60 ML/MIN/1.73M2
GLUCOSE SERPL-MCNC: 121 MG/DL (ref 70–99)
HCT VFR BLD AUTO: 42.5 % (ref 36–46)
HDLC SERPL-MCNC: 72 MG/DL
HGB BLD-MCNC: 13.9 G/DL (ref 12–16)
IMM GRANULOCYTES # BLD AUTO: 0.01 K/UL (ref 0–0.3)
IMM GRANULOCYTES NFR BLD: 0 % (ref 0–5)
LDLC SERPL CALC-MCNC: 98 MG/DL (ref 0–130)
LYMPHOCYTES NFR BLD: 1.53 K/UL (ref 1–4.8)
LYMPHOCYTES RELATIVE PERCENT: 30 % (ref 15–40)
MCH RBC QN AUTO: 29.1 PG (ref 26–34)
MCHC RBC AUTO-ENTMCNC: 32.7 G/DL (ref 31–37)
MCV RBC AUTO: 89.1 FL (ref 80–100)
MONOCYTES NFR BLD: 0.41 K/UL (ref 0–1)
MONOCYTES NFR BLD: 8 % (ref 4–8)
NEUTROPHILS NFR BLD: 57 % (ref 47–75)
NEUTS SEG NFR BLD: 2.92 K/UL (ref 2.5–7)
PLATELET # BLD AUTO: 222 K/UL (ref 140–450)
PMV BLD AUTO: 9.2 FL (ref 6–12)
POTASSIUM SERPL-SCNC: 4.4 MMOL/L (ref 3.7–5.3)
PROT SERPL-MCNC: 7.5 G/DL (ref 6.4–8.3)
RBC # BLD AUTO: 4.77 M/UL (ref 4–5.2)
SODIUM SERPL-SCNC: 137 MMOL/L (ref 135–144)
TRIGL SERPL-MCNC: 143 MG/DL
WBC OTHER # BLD: 5.2 K/UL (ref 3.5–11)

## 2023-11-12 PROCEDURE — 83036 HEMOGLOBIN GLYCOSYLATED A1C: CPT

## 2023-11-12 PROCEDURE — 80061 LIPID PANEL: CPT

## 2023-11-12 PROCEDURE — 82728 ASSAY OF FERRITIN: CPT

## 2023-11-12 PROCEDURE — 36415 COLL VENOUS BLD VENIPUNCTURE: CPT

## 2023-11-12 PROCEDURE — 85025 COMPLETE CBC W/AUTO DIFF WBC: CPT

## 2023-11-12 PROCEDURE — 80053 COMPREHEN METABOLIC PANEL: CPT

## 2023-11-13 LAB
EST. AVERAGE GLUCOSE BLD GHB EST-MCNC: 117 MG/DL
HBA1C MFR BLD: 5.7 % (ref 4–6)

## 2024-06-18 ENCOUNTER — HOSPITAL ENCOUNTER (OUTPATIENT)
Age: 64
Discharge: HOME | End: 2024-06-18
Payer: COMMERCIAL

## 2024-06-18 LAB
ALBUMIN SERPL-MCNC: 4.1 G/DL (ref 3.5–5.2)
ALP SERPL-CCNC: 75 U/L (ref 35–104)
ALT SERPL-CCNC: 23 U/L (ref 5–33)
ANION GAP SERPL CALCULATED.3IONS-SCNC: 17 MMOL/L (ref 9–17)
AST SERPL-CCNC: 21 U/L
BASOPHILS # BLD: 0.02 K/UL (ref 0–0.2)
BASOPHILS NFR BLD: 0 % (ref 0–2)
BILIRUB SERPL-MCNC: 0.4 MG/DL (ref 0.3–1.2)
BUN SERPL-MCNC: 24 MG/DL (ref 8–23)
BUN/CREAT SERPL: 30 (ref 9–20)
CALCIUM SERPL-MCNC: 9.4 MG/DL (ref 8.6–10.4)
CHLORIDE SERPL-SCNC: 100 MMOL/L (ref 98–107)
CHOLEST SERPL-MCNC: 164 MG/DL (ref 0–199)
CHOLESTEROL/HDL RATIO: 2
CO2 SERPL-SCNC: 20 MMOL/L (ref 20–31)
CREAT SERPL-MCNC: 0.8 MG/DL (ref 0.5–0.9)
EOSINOPHIL # BLD: 0.22 K/UL (ref 0–0.4)
EOSINOPHILS RELATIVE PERCENT: 4 % (ref 0–5)
ERYTHROCYTE [DISTWIDTH] IN BLOOD BY AUTOMATED COUNT: 14.7 % (ref 12.1–15.2)
EST. AVERAGE GLUCOSE BLD GHB EST-MCNC: 123 MG/DL
FERRITIN SERPL-MCNC: 410 NG/ML (ref 13–150)
GFR, ESTIMATED: 83 ML/MIN/1.73M2
GLUCOSE SERPL-MCNC: 123 MG/DL (ref 70–99)
HBA1C MFR BLD: 5.9 % (ref 4–6)
HCT VFR BLD AUTO: 42.7 % (ref 36–46)
HDLC SERPL-MCNC: 67 MG/DL
HGB BLD-MCNC: 13.8 G/DL (ref 12–16)
IMM GRANULOCYTES # BLD AUTO: 0.02 K/UL (ref 0–0.3)
IMM GRANULOCYTES NFR BLD: 0 % (ref 0–5)
LDLC SERPL CALC-MCNC: 76 MG/DL (ref 0–100)
LYMPHOCYTES NFR BLD: 1.48 K/UL (ref 1–4.8)
LYMPHOCYTES RELATIVE PERCENT: 25 % (ref 15–40)
MCH RBC QN AUTO: 28.9 PG (ref 26–34)
MCHC RBC AUTO-ENTMCNC: 32.3 G/DL (ref 31–37)
MCV RBC AUTO: 89.5 FL (ref 80–100)
MONOCYTES NFR BLD: 0.43 K/UL (ref 0–1)
MONOCYTES NFR BLD: 7 % (ref 4–8)
NEUTROPHILS NFR BLD: 64 % (ref 47–75)
NEUTS SEG NFR BLD: 3.76 K/UL (ref 2.5–7)
PLATELET # BLD AUTO: 228 K/UL (ref 140–450)
PMV BLD AUTO: 9.5 FL (ref 6–12)
POTASSIUM SERPL-SCNC: 4.5 MMOL/L (ref 3.7–5.3)
PROT SERPL-MCNC: 7.9 G/DL (ref 6.4–8.3)
RBC # BLD AUTO: 4.77 M/UL (ref 4–5.2)
SODIUM SERPL-SCNC: 137 MMOL/L (ref 135–144)
TRIGL SERPL-MCNC: 104 MG/DL
VLDLC SERPL CALC-MCNC: 21 MG/DL
WBC OTHER # BLD: 5.9 K/UL (ref 3.5–11)

## 2024-06-18 PROCEDURE — 36415 COLL VENOUS BLD VENIPUNCTURE: CPT

## 2024-06-18 PROCEDURE — 80061 LIPID PANEL: CPT

## 2024-06-18 PROCEDURE — 85025 COMPLETE CBC W/AUTO DIFF WBC: CPT

## 2024-06-18 PROCEDURE — 80053 COMPREHEN METABOLIC PANEL: CPT

## 2024-06-18 PROCEDURE — 83036 HEMOGLOBIN GLYCOSYLATED A1C: CPT

## 2024-06-18 PROCEDURE — 82728 ASSAY OF FERRITIN: CPT

## 2025-06-18 ENCOUNTER — HOSPITAL ENCOUNTER (OUTPATIENT)
Age: 65
Discharge: HOME | End: 2025-06-18
Payer: COMMERCIAL

## 2025-06-18 LAB
ALBUMIN SERPL-MCNC: 4.2 G/DL (ref 3.5–5.2)
ALBUMIN/GLOB SERPL: 1.3 {RATIO} (ref 1–2.5)
ALP SERPL-CCNC: 77 U/L (ref 35–104)
ALT SERPL-CCNC: 32 U/L (ref 5–33)
ANION GAP SERPL CALCULATED.3IONS-SCNC: 13 MMOL/L (ref 9–17)
AST SERPL-CCNC: 35 U/L
BASOPHILS # BLD: 0.04 K/UL (ref 0–0.2)
BASOPHILS NFR BLD: 1 % (ref 0–2)
BILIRUB SERPL-MCNC: 0.5 MG/DL (ref 0.3–1.2)
BUN SERPL-MCNC: 19 MG/DL (ref 8–23)
CALCIUM SERPL-MCNC: 9.4 MG/DL (ref 8.6–10.4)
CHLORIDE SERPL-SCNC: 102 MMOL/L (ref 98–107)
CHOLEST SERPL-MCNC: 181 MG/DL (ref 0–199)
CHOLESTEROL/HDL RATIO: 2.1
CO2 SERPL-SCNC: 23 MMOL/L (ref 20–31)
CREAT SERPL-MCNC: 0.8 MG/DL (ref 0.5–0.9)
EOSINOPHIL # BLD: 0.2 K/UL (ref 0–0.4)
EOSINOPHILS RELATIVE PERCENT: 3 % (ref 0–5)
ERYTHROCYTE [DISTWIDTH] IN BLOOD BY AUTOMATED COUNT: 15.1 % (ref 12.1–15.2)
GFR, ESTIMATED: 82 ML/MIN/1.73M2
GLUCOSE SERPL-MCNC: 132 MG/DL (ref 70–99)
HCT VFR BLD AUTO: 44.6 % (ref 36–46)
HDLC SERPL-MCNC: 86 MG/DL
HGB BLD-MCNC: 14.3 G/DL (ref 12–16)
IMM GRANULOCYTES # BLD AUTO: 0.02 K/UL (ref 0–0.3)
IMM GRANULOCYTES NFR BLD: 0 % (ref 0–5)
LDLC SERPL CALC-MCNC: 74 MG/DL (ref 0–100)
LYMPHOCYTES NFR BLD: 1.35 K/UL (ref 1–4.8)
LYMPHOCYTES RELATIVE PERCENT: 23 % (ref 15–40)
MCH RBC QN AUTO: 29.4 PG (ref 26–34)
MCHC RBC AUTO-ENTMCNC: 32.1 G/DL (ref 31–37)
MCV RBC AUTO: 91.6 FL (ref 80–100)
MONOCYTES NFR BLD: 0.45 K/UL (ref 0–1)
MONOCYTES NFR BLD: 8 % (ref 4–8)
NEUTROPHILS NFR BLD: 65 % (ref 47–75)
NEUTS SEG NFR BLD: 3.91 K/UL (ref 2.5–7)
PLATELET # BLD AUTO: 229 K/UL (ref 140–450)
PMV BLD AUTO: 9.5 FL (ref 6–12)
POTASSIUM SERPL-SCNC: 4.2 MMOL/L (ref 3.7–5.3)
PROT SERPL-MCNC: 7.5 G/DL (ref 6.4–8.3)
RBC # BLD AUTO: 4.87 M/UL (ref 4–5.2)
SODIUM SERPL-SCNC: 138 MMOL/L (ref 135–144)
TRIGL SERPL-MCNC: 103 MG/DL
VLDLC SERPL CALC-MCNC: 21 MG/DL (ref 1–30)
WBC OTHER # BLD: 6 K/UL (ref 3.5–11)

## 2025-06-18 PROCEDURE — 36415 COLL VENOUS BLD VENIPUNCTURE: CPT

## 2025-06-18 PROCEDURE — 85025 COMPLETE CBC W/AUTO DIFF WBC: CPT

## 2025-06-18 PROCEDURE — 80061 LIPID PANEL: CPT

## 2025-06-18 PROCEDURE — 80053 COMPREHEN METABOLIC PANEL: CPT

## 2025-07-29 ENCOUNTER — HOSPITAL ENCOUNTER (OUTPATIENT)
Age: 65
Discharge: HOME | End: 2025-07-29
Payer: COMMERCIAL

## 2025-07-29 LAB — TSH SERPL DL<=0.05 MIU/L-ACNC: 3.62 UIU/ML (ref 0.27–4.2)

## 2025-07-29 PROCEDURE — 36415 COLL VENOUS BLD VENIPUNCTURE: CPT

## 2025-07-29 PROCEDURE — 84443 ASSAY THYROID STIM HORMONE: CPT
